# Patient Record
Sex: FEMALE | Race: WHITE | NOT HISPANIC OR LATINO | Employment: OTHER | URBAN - METROPOLITAN AREA
[De-identification: names, ages, dates, MRNs, and addresses within clinical notes are randomized per-mention and may not be internally consistent; named-entity substitution may affect disease eponyms.]

---

## 2020-06-09 ENCOUNTER — HOSPITAL ENCOUNTER (EMERGENCY)
Facility: HOSPITAL | Age: 70
Discharge: HOME | End: 2020-06-09
Attending: EMERGENCY MEDICINE | Admitting: EMERGENCY MEDICINE

## 2020-06-09 ENCOUNTER — APPOINTMENT (OUTPATIENT)
Dept: CT IMAGING | Facility: HOSPITAL | Age: 70
End: 2020-06-09

## 2020-06-09 ENCOUNTER — APPOINTMENT (OUTPATIENT)
Dept: GENERAL RADIOLOGY | Facility: HOSPITAL | Age: 70
End: 2020-06-09

## 2020-06-09 VITALS
RESPIRATION RATE: 16 BRPM | DIASTOLIC BLOOD PRESSURE: 91 MMHG | SYSTOLIC BLOOD PRESSURE: 175 MMHG | BODY MASS INDEX: 35.44 KG/M2 | HEIGHT: 63 IN | HEART RATE: 59 BPM | TEMPERATURE: 98.2 F | WEIGHT: 200 LBS | OXYGEN SATURATION: 94 %

## 2020-06-09 DIAGNOSIS — I10 UNCONTROLLED HYPERTENSION: ICD-10-CM

## 2020-06-09 DIAGNOSIS — M79.604 BILATERAL LEG PAIN: ICD-10-CM

## 2020-06-09 DIAGNOSIS — R07.89 CHEST DISCOMFORT: Primary | ICD-10-CM

## 2020-06-09 DIAGNOSIS — M79.605 BILATERAL LEG PAIN: ICD-10-CM

## 2020-06-09 LAB
ALBUMIN SERPL-MCNC: 4.4 G/DL (ref 3.5–5.2)
ALBUMIN/GLOB SERPL: 1.5 G/DL
ALP SERPL-CCNC: 82 U/L (ref 39–117)
ALT SERPL W P-5'-P-CCNC: 20 U/L (ref 1–33)
ANION GAP SERPL CALCULATED.3IONS-SCNC: 14 MMOL/L (ref 5–15)
AST SERPL-CCNC: 25 U/L (ref 1–32)
BASOPHILS # BLD AUTO: 0.04 10*3/MM3 (ref 0–0.2)
BASOPHILS NFR BLD AUTO: 0.7 % (ref 0–1.5)
BILIRUB SERPL-MCNC: 0.9 MG/DL (ref 0.2–1.2)
BUN BLD-MCNC: 11 MG/DL (ref 8–23)
BUN/CREAT SERPL: 10.1 (ref 7–25)
CALCIUM SPEC-SCNC: 9.2 MG/DL (ref 8.6–10.5)
CHLORIDE SERPL-SCNC: 106 MMOL/L (ref 98–107)
CO2 SERPL-SCNC: 21 MMOL/L (ref 22–29)
CREAT BLD-MCNC: 1.09 MG/DL (ref 0.57–1)
D DIMER PPP FEU-MCNC: 0.65 MCGFEU/ML (ref 0–0.56)
DEPRECATED RDW RBC AUTO: 45.7 FL (ref 37–54)
EOSINOPHIL # BLD AUTO: 0.02 10*3/MM3 (ref 0–0.4)
EOSINOPHIL NFR BLD AUTO: 0.3 % (ref 0.3–6.2)
ERYTHROCYTE [DISTWIDTH] IN BLOOD BY AUTOMATED COUNT: 13.2 % (ref 12.3–15.4)
GFR SERPL CREATININE-BSD FRML MDRD: 49 ML/MIN/1.73
GLOBULIN UR ELPH-MCNC: 3 GM/DL
GLUCOSE BLD-MCNC: 112 MG/DL (ref 65–99)
HCT VFR BLD AUTO: 38.3 % (ref 34–46.6)
HGB BLD-MCNC: 12.8 G/DL (ref 12–15.9)
HOLD SPECIMEN: NORMAL
HOLD SPECIMEN: NORMAL
IMM GRANULOCYTES # BLD AUTO: 0.01 10*3/MM3 (ref 0–0.05)
IMM GRANULOCYTES NFR BLD AUTO: 0.2 % (ref 0–0.5)
LIPASE SERPL-CCNC: 14 U/L (ref 13–60)
LYMPHOCYTES # BLD AUTO: 2.4 10*3/MM3 (ref 0.7–3.1)
LYMPHOCYTES NFR BLD AUTO: 41.7 % (ref 19.6–45.3)
MCH RBC QN AUTO: 31.7 PG (ref 26.6–33)
MCHC RBC AUTO-ENTMCNC: 33.4 G/DL (ref 31.5–35.7)
MCV RBC AUTO: 94.8 FL (ref 79–97)
MONOCYTES # BLD AUTO: 0.38 10*3/MM3 (ref 0.1–0.9)
MONOCYTES NFR BLD AUTO: 6.6 % (ref 5–12)
NEUTROPHILS # BLD AUTO: 2.91 10*3/MM3 (ref 1.7–7)
NEUTROPHILS NFR BLD AUTO: 50.5 % (ref 42.7–76)
NRBC BLD AUTO-RTO: 0 /100 WBC (ref 0–0.2)
NT-PROBNP SERPL-MCNC: 249.1 PG/ML (ref 5–900)
PLATELET # BLD AUTO: 260 10*3/MM3 (ref 140–450)
PMV BLD AUTO: 9.6 FL (ref 6–12)
POTASSIUM BLD-SCNC: 3.9 MMOL/L (ref 3.5–5.2)
PROT SERPL-MCNC: 7.4 G/DL (ref 6–8.5)
RBC # BLD AUTO: 4.04 10*6/MM3 (ref 3.77–5.28)
SODIUM BLD-SCNC: 141 MMOL/L (ref 136–145)
TROPONIN T SERPL-MCNC: <0.01 NG/ML (ref 0–0.03)
TROPONIN T SERPL-MCNC: <0.01 NG/ML (ref 0–0.03)
WBC NRBC COR # BLD: 5.76 10*3/MM3 (ref 3.4–10.8)
WHOLE BLOOD HOLD SPECIMEN: NORMAL
WHOLE BLOOD HOLD SPECIMEN: NORMAL

## 2020-06-09 PROCEDURE — 0 IOPAMIDOL PER 1 ML: Performed by: EMERGENCY MEDICINE

## 2020-06-09 PROCEDURE — 93005 ELECTROCARDIOGRAM TRACING: CPT

## 2020-06-09 PROCEDURE — 25010000002 ONDANSETRON PER 1 MG: Performed by: EMERGENCY MEDICINE

## 2020-06-09 PROCEDURE — 71275 CT ANGIOGRAPHY CHEST: CPT

## 2020-06-09 PROCEDURE — 84484 ASSAY OF TROPONIN QUANT: CPT | Performed by: EMERGENCY MEDICINE

## 2020-06-09 PROCEDURE — 71045 X-RAY EXAM CHEST 1 VIEW: CPT

## 2020-06-09 PROCEDURE — 83880 ASSAY OF NATRIURETIC PEPTIDE: CPT | Performed by: EMERGENCY MEDICINE

## 2020-06-09 PROCEDURE — 99284 EMERGENCY DEPT VISIT MOD MDM: CPT

## 2020-06-09 PROCEDURE — 85025 COMPLETE CBC W/AUTO DIFF WBC: CPT | Performed by: EMERGENCY MEDICINE

## 2020-06-09 PROCEDURE — 80053 COMPREHEN METABOLIC PANEL: CPT | Performed by: EMERGENCY MEDICINE

## 2020-06-09 PROCEDURE — 93005 ELECTROCARDIOGRAM TRACING: CPT | Performed by: EMERGENCY MEDICINE

## 2020-06-09 PROCEDURE — 83690 ASSAY OF LIPASE: CPT | Performed by: EMERGENCY MEDICINE

## 2020-06-09 PROCEDURE — 85379 FIBRIN DEGRADATION QUANT: CPT | Performed by: EMERGENCY MEDICINE

## 2020-06-09 PROCEDURE — 96374 THER/PROPH/DIAG INJ IV PUSH: CPT

## 2020-06-09 RX ORDER — SODIUM CHLORIDE 0.9 % (FLUSH) 0.9 %
10 SYRINGE (ML) INJECTION AS NEEDED
Status: DISCONTINUED | OUTPATIENT
Start: 2020-06-09 | End: 2020-06-09 | Stop reason: HOSPADM

## 2020-06-09 RX ORDER — ASPIRIN 81 MG/1
324 TABLET, CHEWABLE ORAL ONCE
Status: COMPLETED | OUTPATIENT
Start: 2020-06-09 | End: 2020-06-09

## 2020-06-09 RX ORDER — FAMOTIDINE 20 MG/1
20 TABLET, FILM COATED ORAL 2 TIMES DAILY
Qty: 30 TABLET | Refills: 0 | Status: SHIPPED | OUTPATIENT
Start: 2020-06-09 | End: 2020-06-19

## 2020-06-09 RX ORDER — ONDANSETRON 2 MG/ML
4 INJECTION INTRAMUSCULAR; INTRAVENOUS ONCE
Status: COMPLETED | OUTPATIENT
Start: 2020-06-09 | End: 2020-06-09

## 2020-06-09 RX ADMIN — ONDANSETRON 4 MG: 2 INJECTION INTRAMUSCULAR; INTRAVENOUS at 20:02

## 2020-06-09 RX ADMIN — ASPIRIN 81 MG 324 MG: 81 TABLET ORAL at 18:04

## 2020-06-09 RX ADMIN — SODIUM CHLORIDE 1000 ML: 9 INJECTION, SOLUTION INTRAVENOUS at 20:02

## 2020-06-09 RX ADMIN — IOPAMIDOL 70 ML: 755 INJECTION, SOLUTION INTRAVENOUS at 19:40

## 2020-06-09 NOTE — ED PROVIDER NOTES
EMERGENCY DEPARTMENT ENCOUNTER    Room Number:  BEAU/BEAU  Date of encounter:  6/10/2020  PCP: Houston Diamond MD  Historian: Patient      HPI:  Chief Complaint: Chest heaviness  A complete HPI/ROS/PMH/PSH/SH/FH are unobtainable due to: N/A    Context: Katerin Pineda is a 69 y.o. female who presents to the ED c/o chest heaviness. She describes this as an intermittent, mild heaviness and denies any pain. This onset 3 days ago. These episodes only last a few seconds in duration when present. She also complains of associated nausea. She spoke with her primary care provider this morning to discuss this and  Her primary care provider is Dr. Diamond. She has a history of diabetes, hypertension and hyperlipidemia but denies drug use, alcohol use, or smoking. She has a family history of heart disease under the age of 50. She has never had a cardiac catheterization. There are no other acute symptoms at this time.    She reports for the past several months she has been experiencing an aching pain in her bilateral feet, usually at night. The pain has migrated into her calves bilaterally last night and she had difficulty sleeping. She reports this concerns her more than the chest discomfort.      PAST MEDICAL HISTORY  Active Ambulatory Problems     Diagnosis Date Noted   • No Active Ambulatory Problems     Resolved Ambulatory Problems     Diagnosis Date Noted   • No Resolved Ambulatory Problems     Past Medical History:   Diagnosis Date   • Anxiety    • Disease of thyroid gland    • Hyperlipidemia    • Hypertension    • Sleep apnea          PAST SURGICAL HISTORY  Past Surgical History:   Procedure Laterality Date   • KNEE ARTHROPLASTY, PARTIAL REPLACEMENT Left    • UVULOPALATOPHARYNGOPLASTY           FAMILY HISTORY  History reviewed. No pertinent family history.      SOCIAL HISTORY  Social History     Socioeconomic History   • Marital status:      Spouse name: Not on file   • Number of children: Not on file   •  Years of education: Not on file   • Highest education level: Not on file   Tobacco Use   • Smoking status: Never Smoker   Substance and Sexual Activity   • Alcohol use: Not Currently   • Drug use: Never         ALLERGIES  Patient has no known allergies.        REVIEW OF SYSTEMS  Review of Systems     All systems reviewed and negative except for those discussed in HPI.       PHYSICAL EXAM    I have reviewed the triage vital signs and nursing notes.    ED Triage Vitals [06/09/20 1632]   Temp Heart Rate Resp BP SpO2   98.2 °F (36.8 °C) 87 16 (!) 200/87 96 %      Temp src Heart Rate Source Patient Position BP Location FiO2 (%)   Infrared Monitor Sitting Left arm --       Physical Exam   Constitutional: She is oriented to person, place, and time and well-developed, well-nourished, and in no distress. No distress.   Appears mildly anxious.    HENT:   Head: Normocephalic and atraumatic.   Right Ear: External ear normal.   Left Ear: External ear normal.   Eyes: Conjunctivae are normal. No scleral icterus.   Neck: Normal range of motion. Neck supple.   Cardiovascular: Normal rate, regular rhythm and normal heart sounds.   Pulmonary/Chest: Effort normal and breath sounds normal. No respiratory distress.   No reproduction of chest discomfort with palpation of the chest wall or epigastric region.   Abdominal: Soft. There is no tenderness.   Musculoskeletal: Normal range of motion. She exhibits no edema.   Calves are symmetric and non-tender. Devon's sign negative bilaterally.   Neurological: She is alert and oriented to person, place, and time. Coordination normal.   Skin: Skin is warm and dry. She is not diaphoretic.   Psychiatric: Affect and judgment normal.   Nursing note and vitals reviewed.        ED COURSE  ED Course as of Jose Angel 10 1314   Tue Jun 09, 2020   6480 Dr. Rodriguez is at the bedside re-evaluating the patient and updating them on results and plan. She is comfortable with outpatient follow up. Patient is unsure if  she has taken her blood pressure medication today. Dr. Rodriguez gave her instruction on what to do at home if this is the case.    [JW]      ED Course User Index  [JW] Chad James         LAB RESULTS  Recent Results (from the past 24 hour(s))   Troponin    Collection Time: 06/09/20  6:14 PM   Result Value Ref Range    Troponin T <0.010 0.000 - 0.030 ng/mL   Comprehensive Metabolic Panel    Collection Time: 06/09/20  6:14 PM   Result Value Ref Range    Glucose 112 (H) 65 - 99 mg/dL    BUN 11 8 - 23 mg/dL    Creatinine 1.09 (H) 0.57 - 1.00 mg/dL    Sodium 141 136 - 145 mmol/L    Potassium 3.9 3.5 - 5.2 mmol/L    Chloride 106 98 - 107 mmol/L    CO2 21.0 (L) 22.0 - 29.0 mmol/L    Calcium 9.2 8.6 - 10.5 mg/dL    Total Protein 7.4 6.0 - 8.5 g/dL    Albumin 4.40 3.50 - 5.20 g/dL    ALT (SGPT) 20 1 - 33 U/L    AST (SGOT) 25 1 - 32 U/L    Alkaline Phosphatase 82 39 - 117 U/L    Total Bilirubin 0.9 0.2 - 1.2 mg/dL    eGFR Non African Amer 49 (L) >60 mL/min/1.73    Globulin 3.0 gm/dL    A/G Ratio 1.5 g/dL    BUN/Creatinine Ratio 10.1 7.0 - 25.0    Anion Gap 14.0 5.0 - 15.0 mmol/L   Lipase    Collection Time: 06/09/20  6:14 PM   Result Value Ref Range    Lipase 14 13 - 60 U/L   BNP    Collection Time: 06/09/20  6:14 PM   Result Value Ref Range    proBNP 249.1 5.0 - 900.0 pg/mL   Light Blue Top    Collection Time: 06/09/20  6:14 PM   Result Value Ref Range    Extra Tube hold for add-on    Green Top (Gel)    Collection Time: 06/09/20  6:14 PM   Result Value Ref Range    Extra Tube Hold for add-ons.    Lavender Top    Collection Time: 06/09/20  6:14 PM   Result Value Ref Range    Extra Tube hold for add-on    Gold Top - SST    Collection Time: 06/09/20  6:14 PM   Result Value Ref Range    Extra Tube Hold for add-ons.    CBC Auto Differential    Collection Time: 06/09/20  6:14 PM   Result Value Ref Range    WBC 5.76 3.40 - 10.80 10*3/mm3    RBC 4.04 3.77 - 5.28 10*6/mm3    Hemoglobin 12.8 12.0 - 15.9 g/dL    Hematocrit 38.3  34.0 - 46.6 %    MCV 94.8 79.0 - 97.0 fL    MCH 31.7 26.6 - 33.0 pg    MCHC 33.4 31.5 - 35.7 g/dL    RDW 13.2 12.3 - 15.4 %    RDW-SD 45.7 37.0 - 54.0 fl    MPV 9.6 6.0 - 12.0 fL    Platelets 260 140 - 450 10*3/mm3    Neutrophil % 50.5 42.7 - 76.0 %    Lymphocyte % 41.7 19.6 - 45.3 %    Monocyte % 6.6 5.0 - 12.0 %    Eosinophil % 0.3 0.3 - 6.2 %    Basophil % 0.7 0.0 - 1.5 %    Immature Grans % 0.2 0.0 - 0.5 %    Neutrophils, Absolute 2.91 1.70 - 7.00 10*3/mm3    Lymphocytes, Absolute 2.40 0.70 - 3.10 10*3/mm3    Monocytes, Absolute 0.38 0.10 - 0.90 10*3/mm3    Eosinophils, Absolute 0.02 0.00 - 0.40 10*3/mm3    Basophils, Absolute 0.04 0.00 - 0.20 10*3/mm3    Immature Grans, Absolute 0.01 0.00 - 0.05 10*3/mm3    nRBC 0.0 0.0 - 0.2 /100 WBC   D-dimer, Quantitative    Collection Time: 06/09/20  6:14 PM   Result Value Ref Range    D-Dimer, Quantitative 0.65 (H) 0.00 - 0.56 MCGFEU/mL   Troponin    Collection Time: 06/09/20  8:34 PM   Result Value Ref Range    Troponin T <0.010 0.000 - 0.030 ng/mL       Ordered the above labs and independently reviewed the results.        RADIOLOGY  Xr Chest 1 View    Result Date: 6/9/2020  EXAMINATION: XR CHEST 1 VW- 06/09/2020  INDICATION: Chest Pain triage protocol  COMPARISON: None  FINDINGS: Heart is mildly enlarged. Vasculature appears upper limits of normal. Lungs appear well-expanded and clear except for granulomatous calcifications in the right hilar region.      Mild cardiomegaly and evidence of old granulomatous disease. No acute chest pathology is identified.   D:  06/09/2020 E:  06/09/2020  This report was finalized on 6/9/2020 10:39 PM by Dr. Hitesh Tejeda MD.      Ct Angiogram Chest    Result Date: 6/9/2020  CTA Chest INDICATION: One day history of chest heaviness and nausea with elevated d-dimer. TECHNIQUE: CT angiogram of the chest with 100 cc IV contrast. 3-D reconstructions were obtained and reviewed.   Radiation dose reduction techniques included automated exposure  control or exposure modulation based on body size. Count of known CT and cardiac nuc med studies performed in previous 12 months: 0. COMPARISON: No pertinent prior study FINDINGS: Contrast timing is appropriate for maximal sensitivity. The main pulmonary trunk is of normal caliber. No filling defects in the central, lobar, or segmental pulmonary arteries. No interventricular septal bowing or hepatic reflux of contrast to suggest right heart strain. Heart size is normal. No pericardial effusion. The aorta is non aneurysmal without evidence of acute injury. Central airways are patent. No endobronchial lesions. No focal consolidation, pleural effusion, or pneumothorax. No enlarged mediastinal, axillary or hilar lymph nodes. No acute findings in visualized upper abdomen. Regional osseous structures show no acute or aggressive bone lesions. Degenerative changes of the thoracic spine.     Impression: 1. No evidence of pulmonary embolus on this study. 2. No acute radiographic abnormality is demonstrated. Signer Name: Ad Roberts MD  Signed: 6/9/2020 7:58 PM  Workstation Name: [facility]  [facility]        I ordered and reviewed the above noted radiographic studies.    I viewed images of CTA chest which showed no evidence of pulmonary embolism or other abnormality per my independent interpretation.  See radiologist's dictation for official interpretation.       PROCEDURES    Procedures      MEDICATIONS GIVEN IN ER    Medications   aspirin chewable tablet 324 mg (324 mg Oral Given 6/9/20 1804)   sodium chloride 0.9 % bolus 1,000 mL (0 mL Intravenous Stopped 6/9/20 2151)   ondansetron (ZOFRAN) injection 4 mg (4 mg Intravenous Given 6/9/20 2002)   iopamidol (ISOVUE-370) 76 % injection 100 mL (70 mL Intravenous Given 6/9/20 1940)         PROGRESS, DATA ANALYSIS, CONSULTS, AND MEDICAL DECISION MAKING    All labs have been independently reviewed by me.  All radiology studies have been reviewed by me  and discussed with radiologist dictating the report.   EKG's independently viewed and interpreted by me.  Discussion below represents my analysis of pertinent findings related to patient's condition, differential diagnosis, treatment plan and final disposition.        ED Course as of Jose Angel 10 1314   Tue Jun 09, 2020   2140 Dr. Rodriguez is at the bedside re-evaluating the patient and updating them on results and plan. She is comfortable with outpatient follow up. Patient is unsure if she has taken her blood pressure medication today. Dr. Rodriguez gave her instruction on what to do at home if this is the case.    [JW]      ED Course User Index  [JW] Chad James           PPE: Both the patient and I worse a surgical mask throughout the entire patient encounter.     AS OF 13:14 VITALS:    BP - 175/91  HR - 59  TEMP - 98.2 °F (36.8 °C) (Infrared)  O2 SATS - 94%        DIAGNOSIS  Final diagnoses:   Chest discomfort   Bilateral leg pain   Uncontrolled hypertension         DISPOSITION  DISCHARGE    Patient discharged in stable condition.    Reviewed implications of results, diagnosis, meds, responsibility to follow up, warning signs and symptoms of possible worsening, potential complications and reasons to return to ER.    Patient/Family voiced understanding of above instructions.    Discussed plan for discharge, as there is no emergent indication for admission.  Pt/family is agreeable and understands need for follow up and possible repeat testing.  Pt/family is aware that discharge does not mean that nothing is wrong but that it indicates no emergency is currently present that requires admission and they must continue care with follow-up as given below or with a physician of their choice.     FOLLOW-UP  Houston Diamond MD  [address] [place] [id number] [phone]      Next available for re-check    [facility]  [address]  [facility]  [address]  [phone]        [facility]  [address]  [address]  [facility] [address]  [phone]             Medication List      New Prescriptions    famotidine 20 MG tablet  Commonly known as:  PEPCID  Take 1 tablet by mouth 2 (Two) Times a Day.                     Chad James  06/09/20 2129       Chad James  06/09/20 2133       Chad James  06/09/20 2141       Royce Rodriguez MD  06/10/20 3972

## 2020-06-10 NOTE — DISCHARGE INSTRUCTIONS
Please check your blood pressure 3 times a day. Keep a chart of these readings and any correlation to symptoms you might be having and bring this chart to the follow up with your primary care physician. If you don't already have a good blood pressure cuff, I recommend the following:    [employer] - [employer] Blood Pressure Monitor (for upper arm)    or similar upper arm blood pressure cuffs which can be purchased for around $25.    Take your blood pressure medication in the morning after checking your blood pressure.    Follow up with the [facility] as I have ordered.     Avoid heavy or late meals in the evening.    Begin taking Pepcid as prescribed.    Follow up with your primary care provider next available. Call for an appointment.    Return to the ER if you experience any new or worsening symptoms.

## 2020-06-18 NOTE — PROGRESS NOTES
[facility]      06/19/2020         Katerin Pineda  [address] [facility] [address]  [unfilled]    1950    Houston Diamond MD    Katerin Pineda is a 69 y.o. female.      Subjective:     Chief Complaint:  Establish Care and Chest Pain       HPI   69-year-old female with history of diabetes, hypertension, hyperlipidemia, hypothyroidism, obstructive sleep apnea and depression who presents today for evaluation of chest pain at the request of Dr. Rodriguez.  Patient presented the ED on 6/9 with a 3-day history of intermittent, mild chest heaviness.  She reports the episodes only last for few seconds in duration.  She does have associated nausea.  CTA chest negative for PE.  Chest x-ray showed mild cardiomegaly but no acute abnormalities.  EKG negative for acute findings.  Troponins negative.    She reports that she had a 2 week history of nausea and a terrible taste in her mouth associated with decreased appetite. She also noted some burning in her feet.  She called her PCP to try and get in and mention that she also was having some pressure in her chest and was advised to go to the ED.  She reports that she was not concerned at all about the mild chest pressure but was more concerned about the burning in her feet.  She reports that symptoms have since resolved.  She does note exertional dyspnea, which is chronic for her.  Denies any worsening shortness of breath.    Patient had normal Lexiscan stress test in August 2019    Cardiac risk: Hypertension, diabetes, hyperlipidemia, age, obesity, family hx     Patient Active Problem List   Diagnosis   • Hyperlipidemia   • Hypertensive disorder   • Obstructive sleep apnea syndrome   • Type 2 diabetes mellitus without complication (CMS/[facility])   • Hypothyroidism   • Chronic depression   • Obesity   • Vitamin D deficiency       Past Medical History:   Diagnosis Date   • Anxiety    • Disease of thyroid gland    • Hyperlipidemia    •  Hypertension    • Sleep apnea        Past Surgical History:   Procedure Laterality Date   • KNEE ARTHROPLASTY, PARTIAL REPLACEMENT Left    • OTHER SURGICAL HISTORY      blsdder control implant   • UVULOPALATOPHARYNGOPLASTY         Family History   Problem Relation Age of Onset   • No Known Problems Mother    • Heart attack Father 45   • Coronary artery disease Sister 60   • Cancer Brother    • No Known Problems Maternal Grandmother    • No Known Problems Maternal Grandfather    • No Known Problems Paternal Grandmother    • No Known Problems Paternal Grandfather    • David Parkinson White syndrome Other        Social History     Socioeconomic History   • Marital status:      Spouse name: Not on file   • Number of children: Not on file   • Years of education: Not on file   • Highest education level: Not on file   Tobacco Use   • Smoking status: Never Smoker   • Smokeless tobacco: Never Used   Substance and Sexual Activity   • Alcohol use: Not Currently   • Drug use: Never   • Sexual activity: Defer   Social History Narrative    Caffeine: Seldom 2 glasses in week       No Known Allergies      Current Outpatient Medications:   •  amLODIPine (NORVASC) 5 MG tablet, amlodipine 5 mg tablet  TAKE 1 TABLET EVERY DAY, Disp: , Rfl:   •  benazepril (LOTENSIN) 40 MG tablet, Take 40 mg by mouth Daily., Disp: , Rfl:   •  ezetimibe (ZETIA) 10 MG tablet, ezetimibe 10 mg tablet  1 qd, Disp: , Rfl:   •  levothyroxine (SYNTHROID, LEVOTHROID) 75 MCG tablet, levothyroxine 75 mcg tablet  1 Daily, Disp: , Rfl:   •  metFORMIN (GLUCOPHAGE) 500 MG tablet, metformin 500 mg tablet  TAKE 1 TABLET TWICE DAILY, Disp: , Rfl:   •  pantoprazole (PROTONIX) 40 MG EC tablet, Take 1 tablet by mouth Daily., Disp: 30 tablet, Rfl: 0  •  pravastatin (PRAVACHOL) 40 MG tablet, pravastatin 40 mg tablet  Take 1 tablet every day by oral route., Disp: , Rfl:   •  sertraline (ZOLOFT) 100 MG tablet, Take 100 mg by mouth Daily., Disp: , Rfl:     The following  "portions of the patient's history were reviewed today and updated as appropriate: allergies, current medications, past family history, past medical history, past social history, past surgical history and problem list     Review of Systems   Constitution: Positive for malaise/fatigue. Negative for chills and fever.   HENT: Negative.    Eyes: Negative.    Cardiovascular: Positive for dyspnea on exertion. Negative for chest pain, claudication, cyanosis, irregular heartbeat, leg swelling, near-syncope, orthopnea, palpitations, paroxysmal nocturnal dyspnea and syncope.   Respiratory: Negative for cough, shortness of breath and snoring.    Endocrine: Negative.    Hematologic/Lymphatic: Does not bruise/bleed easily.   Skin: Negative for poor wound healing.   Musculoskeletal: Negative.    Gastrointestinal: Positive for heartburn. Negative for abdominal pain, hematemesis, melena, nausea and vomiting.   Genitourinary: Negative.  Negative for hematuria.   Neurological: Negative.    Psychiatric/Behavioral: Positive for depression. The patient is nervous/anxious.    Allergic/Immunologic: Negative.        Objective:     Vitals:    06/19/20 1319 06/19/20 1320   BP: 162/73 157/70   BP Location: Right arm Right arm   Patient Position: Sitting Standing   Cuff Size: Adult Adult   Pulse: 85 81   Resp:  18   Temp:  98.3 °F (36.8 °C)   TempSrc:  Temporal   SpO2: 95% 96%   Weight:  94.3 kg (208 lb)   Height:  160 cm (63\")       Body mass index is 36.85 kg/m².    Physical Exam   Constitutional: She is oriented to person, place, and time. She appears well-developed and well-nourished. No distress.   HENT:   Head: Normocephalic.   Eyes: Pupils are equal, round, and reactive to light. Conjunctivae are normal.   Neck: Neck supple. No JVD present. No thyromegaly present.   Cardiovascular: Normal rate, regular rhythm and intact distal pulses. Exam reveals no gallop and no friction rub.   Murmur (SHANNAN II) heard.  Pulmonary/Chest: Effort normal and " breath sounds normal. No respiratory distress. She has no wheezes. She has no rales. She exhibits no tenderness.   Abdominal: Soft. Bowel sounds are normal.   Musculoskeletal: Normal range of motion. She exhibits edema (1+ BLE).   Neurological: She is alert and oriented to person, place, and time.   Skin: Skin is warm and dry.   Psychiatric: She has a normal mood and affect. Her behavior is normal. Thought content normal.   Vitals reviewed.      Lab and Diagnostic Review:  Labs from [facility] 7/2019 hemoglobin A1c 5.9%.  Total cholesterol 169, LDL 66, HDL 83, triglycerides 101.    Normal nuclear stress test 8/2019  Results for orders placed or performed during the hospital encounter of 06/09/20   Troponin   Result Value Ref Range    Troponin T <0.010 0.000 - 0.030 ng/mL   Comprehensive Metabolic Panel   Result Value Ref Range    Glucose 112 (H) 65 - 99 mg/dL    BUN 11 8 - 23 mg/dL    Creatinine 1.09 (H) 0.57 - 1.00 mg/dL    Sodium 141 136 - 145 mmol/L    Potassium 3.9 3.5 - 5.2 mmol/L    Chloride 106 98 - 107 mmol/L    CO2 21.0 (L) 22.0 - 29.0 mmol/L    Calcium 9.2 8.6 - 10.5 mg/dL    Total Protein 7.4 6.0 - 8.5 g/dL    Albumin 4.40 3.50 - 5.20 g/dL    ALT (SGPT) 20 1 - 33 U/L    AST (SGOT) 25 1 - 32 U/L    Alkaline Phosphatase 82 39 - 117 U/L    Total Bilirubin 0.9 0.2 - 1.2 mg/dL    eGFR Non African Amer 49 (L) >60 mL/min/1.73    Globulin 3.0 gm/dL    A/G Ratio 1.5 g/dL    BUN/Creatinine Ratio 10.1 7.0 - 25.0    Anion Gap 14.0 5.0 - 15.0 mmol/L   Lipase   Result Value Ref Range    Lipase 14 13 - 60 U/L   BNP   Result Value Ref Range    proBNP 249.1 5.0 - 900.0 pg/mL   CBC Auto Differential   Result Value Ref Range    WBC 5.76 3.40 - 10.80 10*3/mm3    RBC 4.04 3.77 - 5.28 10*6/mm3    Hemoglobin 12.8 12.0 - 15.9 g/dL    Hematocrit 38.3 34.0 - 46.6 %    MCV 94.8 79.0 - 97.0 fL    MCH 31.7 26.6 - 33.0 pg    MCHC 33.4 31.5 - 35.7 g/dL    RDW 13.2 12.3 - 15.4 %    RDW-SD 45.7 37.0 - 54.0 fl    MPV 9.6 6.0 - 12.0  fL    Platelets 260 140 - 450 10*3/mm3    Neutrophil % 50.5 42.7 - 76.0 %    Lymphocyte % 41.7 19.6 - 45.3 %    Monocyte % 6.6 5.0 - 12.0 %    Eosinophil % 0.3 0.3 - 6.2 %    Basophil % 0.7 0.0 - 1.5 %    Immature Grans % 0.2 0.0 - 0.5 %    Neutrophils, Absolute 2.91 1.70 - 7.00 10*3/mm3    Lymphocytes, Absolute 2.40 0.70 - 3.10 10*3/mm3    Monocytes, Absolute 0.38 0.10 - 0.90 10*3/mm3    Eosinophils, Absolute 0.02 0.00 - 0.40 10*3/mm3    Basophils, Absolute 0.04 0.00 - 0.20 10*3/mm3    Immature Grans, Absolute 0.01 0.00 - 0.05 10*3/mm3    nRBC 0.0 0.0 - 0.2 /100 WBC   D-dimer, Quantitative   Result Value Ref Range    D-Dimer, Quantitative 0.65 (H) 0.00 - 0.56 MCGFEU/mL   Troponin   Result Value Ref Range    Troponin T <0.010 0.000 - 0.030 ng/mL     EKG 6/9/20  Normal sinus rhythm  Septal infarct (cited on or before 09-JUN-2020)  Abnormal ECG    Assessment and Plan:   1. Chest pain, unspecified type  Symptoms have resolved.  She denies chest pain multiple times for me and continues to correct me saying that it was only a very mild chest pressure.  She also notes some associated epigastric discomfort and bad taste in her mouth.  Symptoms likely related to GERD.  We will start her on Protonix 40 mg daily.  She does have multiple ASCVD risks.  Normal stress test less than 1 year ago.  I have advised her to call with any worsening symptoms    2. Essential hypertension  Patient was anxious at the time that her blood pressure was checked.  I have advised her to start monitoring at home and keep a record  - Adult Transthoracic Echo Complete W/ Cont if Necessary Per Protocol; Future    3. SHAW (dyspnea on exertion)  - Adult Transthoracic Echo Complete W/ Cont if Necessary Per Protocol; Future    4. Heart murmur  - Adult Transthoracic Echo Complete W/ Cont if Necessary Per Protocol; Future    5. GERD  Start protonix 40mg daily    6. Hyperlipidemia  Controlled on pravastatin and zetia      Consider cardiology referral pending  echo results and symptoms  Keep upcoming follow-up with PCP regarding GERD and neuropathic pain in her legs    It has been a pleasure to participate in the care of this patient.  Patient was instructed to call the [facility] with any questions, concerns, or worsening symptoms.    *Please note that portions of this note were completed with a voice recognition program. Efforts were made to edit the dictations, but occasionally words are mistranscribed.

## 2020-06-19 ENCOUNTER — OFFICE VISIT (OUTPATIENT)
Dept: CARDIOLOGY | Facility: HOSPITAL | Age: 70
End: 2020-06-19

## 2020-06-19 VITALS
BODY MASS INDEX: 36.86 KG/M2 | HEIGHT: 63 IN | TEMPERATURE: 98.3 F | SYSTOLIC BLOOD PRESSURE: 157 MMHG | OXYGEN SATURATION: 96 % | HEART RATE: 81 BPM | WEIGHT: 208 LBS | RESPIRATION RATE: 18 BRPM | DIASTOLIC BLOOD PRESSURE: 70 MMHG

## 2020-06-19 DIAGNOSIS — R01.1 HEART MURMUR: ICD-10-CM

## 2020-06-19 DIAGNOSIS — K21.9 GASTROESOPHAGEAL REFLUX DISEASE, ESOPHAGITIS PRESENCE NOT SPECIFIED: ICD-10-CM

## 2020-06-19 DIAGNOSIS — E78.5 HYPERLIPIDEMIA, UNSPECIFIED HYPERLIPIDEMIA TYPE: ICD-10-CM

## 2020-06-19 DIAGNOSIS — R06.09 DOE (DYSPNEA ON EXERTION): ICD-10-CM

## 2020-06-19 DIAGNOSIS — R07.9 CHEST PAIN, UNSPECIFIED TYPE: Primary | ICD-10-CM

## 2020-06-19 DIAGNOSIS — I10 ESSENTIAL HYPERTENSION: ICD-10-CM

## 2020-06-19 PROBLEM — G47.33 OBSTRUCTIVE SLEEP APNEA SYNDROME: Status: ACTIVE | Noted: 2019-07-16

## 2020-06-19 PROCEDURE — 99204 OFFICE O/P NEW MOD 45 MIN: CPT | Performed by: NURSE PRACTITIONER

## 2020-06-19 RX ORDER — BENAZEPRIL HYDROCHLORIDE 40 MG/1
40 TABLET, FILM COATED ORAL DAILY
COMMUNITY

## 2020-06-19 RX ORDER — SERTRALINE HYDROCHLORIDE 100 MG/1
100 TABLET, FILM COATED ORAL DAILY
COMMUNITY

## 2020-06-19 RX ORDER — PANTOPRAZOLE SODIUM 40 MG/1
40 TABLET, DELAYED RELEASE ORAL DAILY
Qty: 30 TABLET | Refills: 0 | Status: SHIPPED | OUTPATIENT
Start: 2020-06-19

## 2020-06-19 RX ORDER — PRAVASTATIN SODIUM 40 MG
TABLET ORAL
COMMUNITY

## 2020-06-19 RX ORDER — LEVOTHYROXINE SODIUM 0.07 MG/1
TABLET ORAL
COMMUNITY

## 2020-06-19 RX ORDER — AMLODIPINE BESYLATE 5 MG/1
TABLET ORAL
COMMUNITY

## 2020-06-19 RX ORDER — EZETIMIBE 10 MG/1
TABLET ORAL
COMMUNITY

## 2020-10-31 ENCOUNTER — NURSE TRIAGE (OUTPATIENT)
Dept: CALL CENTER | Facility: HOSPITAL | Age: 70
End: 2020-10-31

## 2020-10-31 ENCOUNTER — APPOINTMENT (OUTPATIENT)
Dept: GENERAL RADIOLOGY | Facility: HOSPITAL | Age: 70
End: 2020-10-31

## 2020-10-31 ENCOUNTER — HOSPITAL ENCOUNTER (EMERGENCY)
Facility: HOSPITAL | Age: 70
Discharge: HOME | End: 2020-10-31
Attending: EMERGENCY MEDICINE | Admitting: EMERGENCY MEDICINE

## 2020-10-31 VITALS
TEMPERATURE: 98.6 F | SYSTOLIC BLOOD PRESSURE: 149 MMHG | HEIGHT: 63 IN | RESPIRATION RATE: 16 BRPM | BODY MASS INDEX: 35.44 KG/M2 | WEIGHT: 200 LBS | DIASTOLIC BLOOD PRESSURE: 83 MMHG | HEART RATE: 48 BPM | OXYGEN SATURATION: 95 %

## 2020-10-31 DIAGNOSIS — R52 GENERALIZED BODY ACHES: ICD-10-CM

## 2020-10-31 DIAGNOSIS — R51.9 ACUTE NONINTRACTABLE HEADACHE, UNSPECIFIED HEADACHE TYPE: ICD-10-CM

## 2020-10-31 DIAGNOSIS — R68.89 FLU-LIKE SYMPTOMS: ICD-10-CM

## 2020-10-31 DIAGNOSIS — E86.0 MILD DEHYDRATION: Primary | ICD-10-CM

## 2020-10-31 DIAGNOSIS — R82.81 PYURIA, STERILE: ICD-10-CM

## 2020-10-31 LAB
ALBUMIN SERPL-MCNC: 4.5 G/DL (ref 3.5–5.2)
ALBUMIN/GLOB SERPL: 1.6 G/DL
ALP SERPL-CCNC: 82 U/L (ref 39–117)
ALT SERPL W P-5'-P-CCNC: 16 U/L (ref 1–33)
ANION GAP SERPL CALCULATED.3IONS-SCNC: 13 MMOL/L (ref 5–15)
AST SERPL-CCNC: 20 U/L (ref 1–32)
BACTERIA UR QL AUTO: ABNORMAL /HPF
BASOPHILS # BLD AUTO: 0.01 10*3/MM3 (ref 0–0.2)
BASOPHILS NFR BLD AUTO: 0.3 % (ref 0–1.5)
BILIRUB SERPL-MCNC: 0.4 MG/DL (ref 0–1.2)
BILIRUB UR QL STRIP: ABNORMAL
BUN SERPL-MCNC: 12 MG/DL (ref 8–23)
BUN/CREAT SERPL: 12 (ref 7–25)
CALCIUM SPEC-SCNC: 9.4 MG/DL (ref 8.6–10.5)
CHLORIDE SERPL-SCNC: 107 MMOL/L (ref 98–107)
CLARITY UR: ABNORMAL
CO2 SERPL-SCNC: 20 MMOL/L (ref 22–29)
COLOR UR: ABNORMAL
CREAT SERPL-MCNC: 1 MG/DL (ref 0.57–1)
DEPRECATED RDW RBC AUTO: 46.5 FL (ref 37–54)
EOSINOPHIL # BLD AUTO: 0.02 10*3/MM3 (ref 0–0.4)
EOSINOPHIL NFR BLD AUTO: 0.7 % (ref 0.3–6.2)
ERYTHROCYTE [DISTWIDTH] IN BLOOD BY AUTOMATED COUNT: 13.2 % (ref 12.3–15.4)
FLUAV SUBTYP SPEC NAA+PROBE: NOT DETECTED
FLUBV RNA ISLT QL NAA+PROBE: NOT DETECTED
GFR SERPL CREATININE-BSD FRML MDRD: 55 ML/MIN/1.73
GLOBULIN UR ELPH-MCNC: 2.8 GM/DL
GLUCOSE SERPL-MCNC: 127 MG/DL (ref 65–99)
GLUCOSE UR STRIP-MCNC: ABNORMAL MG/DL
HCT VFR BLD AUTO: 40 % (ref 34–46.6)
HGB BLD-MCNC: 13.3 G/DL (ref 12–15.9)
HGB UR QL STRIP.AUTO: ABNORMAL
HOLD SPECIMEN: NORMAL
HOLD SPECIMEN: NORMAL
HYALINE CASTS UR QL AUTO: ABNORMAL /LPF
IMM GRANULOCYTES # BLD AUTO: 0.01 10*3/MM3 (ref 0–0.05)
IMM GRANULOCYTES NFR BLD AUTO: 0.3 % (ref 0–0.5)
KETONES UR QL STRIP: ABNORMAL
LEUKOCYTE ESTERASE UR QL STRIP.AUTO: NEGATIVE
LYMPHOCYTES # BLD AUTO: 0.73 10*3/MM3 (ref 0.7–3.1)
LYMPHOCYTES NFR BLD AUTO: 24.4 % (ref 19.6–45.3)
MCH RBC QN AUTO: 31.7 PG (ref 26.6–33)
MCHC RBC AUTO-ENTMCNC: 33.3 G/DL (ref 31.5–35.7)
MCV RBC AUTO: 95.2 FL (ref 79–97)
MONOCYTES # BLD AUTO: 0.43 10*3/MM3 (ref 0.1–0.9)
MONOCYTES NFR BLD AUTO: 14.4 % (ref 5–12)
NEUTROPHILS NFR BLD AUTO: 1.79 10*3/MM3 (ref 1.7–7)
NEUTROPHILS NFR BLD AUTO: 59.9 % (ref 42.7–76)
NITRITE UR QL STRIP: NEGATIVE
NRBC BLD AUTO-RTO: 0 /100 WBC (ref 0–0.2)
NT-PROBNP SERPL-MCNC: 176.5 PG/ML (ref 0–900)
PH UR STRIP.AUTO: <=5 [PH] (ref 5–8)
PLATELET # BLD AUTO: 206 10*3/MM3 (ref 140–450)
PMV BLD AUTO: 9.4 FL (ref 6–12)
POTASSIUM SERPL-SCNC: 3.9 MMOL/L (ref 3.5–5.2)
PROT SERPL-MCNC: 7.3 G/DL (ref 6–8.5)
PROT UR QL STRIP: ABNORMAL
RBC # BLD AUTO: 4.2 10*6/MM3 (ref 3.77–5.28)
RBC # UR: ABNORMAL /HPF
REF LAB TEST METHOD: ABNORMAL
SODIUM SERPL-SCNC: 140 MMOL/L (ref 136–145)
SP GR UR STRIP: 1.05 (ref 1–1.03)
SQUAMOUS #/AREA URNS HPF: ABNORMAL /HPF
TROPONIN T SERPL-MCNC: <0.01 NG/ML (ref 0–0.03)
UROBILINOGEN UR QL STRIP: ABNORMAL
WBC # BLD AUTO: 2.99 10*3/MM3 (ref 3.4–10.8)
WBC UR QL AUTO: ABNORMAL /HPF
WHOLE BLOOD HOLD SPECIMEN: NORMAL
WHOLE BLOOD HOLD SPECIMEN: NORMAL
YEAST URNS QL MICRO: ABNORMAL /HPF

## 2020-10-31 PROCEDURE — 71045 X-RAY EXAM CHEST 1 VIEW: CPT

## 2020-10-31 PROCEDURE — 83880 ASSAY OF NATRIURETIC PEPTIDE: CPT | Performed by: EMERGENCY MEDICINE

## 2020-10-31 PROCEDURE — 96374 THER/PROPH/DIAG INJ IV PUSH: CPT

## 2020-10-31 PROCEDURE — 25010000002 KETOROLAC TROMETHAMINE PER 15 MG: Performed by: EMERGENCY MEDICINE

## 2020-10-31 PROCEDURE — 87502 INFLUENZA DNA AMP PROBE: CPT | Performed by: EMERGENCY MEDICINE

## 2020-10-31 PROCEDURE — 85025 COMPLETE CBC W/AUTO DIFF WBC: CPT | Performed by: EMERGENCY MEDICINE

## 2020-10-31 PROCEDURE — 99284 EMERGENCY DEPT VISIT MOD MDM: CPT

## 2020-10-31 PROCEDURE — 93005 ELECTROCARDIOGRAM TRACING: CPT | Performed by: EMERGENCY MEDICINE

## 2020-10-31 PROCEDURE — 80053 COMPREHEN METABOLIC PANEL: CPT | Performed by: EMERGENCY MEDICINE

## 2020-10-31 PROCEDURE — 84484 ASSAY OF TROPONIN QUANT: CPT | Performed by: EMERGENCY MEDICINE

## 2020-10-31 PROCEDURE — 81001 URINALYSIS AUTO W/SCOPE: CPT | Performed by: EMERGENCY MEDICINE

## 2020-10-31 PROCEDURE — U0004 COV-19 TEST NON-CDC HGH THRU: HCPCS | Performed by: EMERGENCY MEDICINE

## 2020-10-31 RX ORDER — SODIUM CHLORIDE 0.9 % (FLUSH) 0.9 %
10 SYRINGE (ML) INJECTION AS NEEDED
Status: DISCONTINUED | OUTPATIENT
Start: 2020-10-31 | End: 2020-10-31 | Stop reason: HOSPADM

## 2020-10-31 RX ORDER — NAPROXEN 500 MG/1
500 TABLET ORAL 2 TIMES DAILY PRN
Qty: 12 TABLET | Refills: 0 | OUTPATIENT
Start: 2020-10-31 | End: 2021-01-30

## 2020-10-31 RX ORDER — NITROFURANTOIN 25; 75 MG/1; MG/1
100 CAPSULE ORAL 2 TIMES DAILY
Qty: 10 CAPSULE | Refills: 0 | Status: SHIPPED | OUTPATIENT
Start: 2020-10-31 | End: 2020-10-31 | Stop reason: ALTCHOICE

## 2020-10-31 RX ORDER — CEFDINIR 300 MG/1
300 CAPSULE ORAL 2 TIMES DAILY
Qty: 10 CAPSULE | Refills: 0 | Status: SHIPPED | OUTPATIENT
Start: 2020-10-31 | End: 2020-11-05

## 2020-10-31 RX ORDER — KETOROLAC TROMETHAMINE 30 MG/ML
15 INJECTION, SOLUTION INTRAMUSCULAR; INTRAVENOUS ONCE
Status: COMPLETED | OUTPATIENT
Start: 2020-10-31 | End: 2020-10-31

## 2020-10-31 RX ADMIN — SODIUM CHLORIDE 1000 ML: 9 INJECTION, SOLUTION INTRAVENOUS at 15:40

## 2020-10-31 RX ADMIN — KETOROLAC TROMETHAMINE 15 MG: 30 INJECTION, SOLUTION INTRAMUSCULAR; INTRAVENOUS at 13:52

## 2020-10-31 NOTE — TELEPHONE ENCOUNTER
"Her Mom has been sick since OCtober 21st. She has been in the bed, joint pain, headache, loss of wanting to eat, heaviness in chest, nore with breathing. She is not feeling well. 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 She just told her daughter. Advise to self isolate, needs to be seen today.     Reason for Disposition  • HIGH RISK patient (e.g., age > 64 years, diabetes, heart or lung disease, weak immune system)    Additional Information  • Negative: SEVERE difficulty breathing (e.g., struggling for each breath, speaks in single words)  • Negative: Difficult to awaken or acting confused (e.g., disoriented, slurred speech)  • Negative: Bluish (or gray) lips or face now  • Negative: Shock suspected (e.g., cold/pale/clammy skin, too weak to stand, low BP, rapid pulse)  • Negative: Sounds like a life-threatening emergency to the triager  • Negative: [1] COVID-19 exposure AND [2] no symptoms  • Negative: COVID-19 and Breastfeeding, questions about  • Negative: [1] Adult with possible COVID-19 symptoms AND [2] triager concerned about severity of symptoms or other causes  • Negative: SEVERE or constant chest pain or pressure (Exception: mild central chest pain, present only when coughing)  • Negative: MODERATE difficulty breathing (e.g., speaks in phrases, SOB even at rest, pulse 100-120)  • Negative: Patient sounds very sick or weak to the triager  • Negative: MILD difficulty breathing (e.g., minimal/no SOB at rest, SOB with walking, pulse <100)  • Negative: Chest pain or pressure  • Negative: Fever > 103 F (39.4 C)  • Negative: [1] Fever > 101 F (38.3 C) AND [2] age > 60  • Negative: [1] Fever > 100.0 F (37.8 C) AND [2] bedridden (e.g., nursing home patient, CVA, chronic illness, recovering from surgery)    Answer Assessment - Initial Assessment Questions  1. COVID-19 DIAGNOSIS: \"Who made your Coronavirus (COVID-19) diagnosis?\" \"Was it confirmed by a positive lab " "test?\" If not diagnosed by a HCP, ask \"Are there lots of cases (community spread) where you live?\" (See public health department website, if unsure)      It has not been confirmed.   2. ONSET: \"When did the COVID-19 symptoms start?\"       10/21/2020  3. WORST SYMPTOM: \"What is your worst symptom?\" (e.g., cough, fever, shortness of breath, muscle aches)      Head pain   4. COUGH: \"Do you have a cough?\" If so, ask: \"How bad is the cough?\"        A little bit   5. FEVER: \"Do you have a fever?\" If so, ask: \"What is your temperature, how was it measured, and when did it start?\"      She thinks she may be warm.  6. RESPIRATORY STATUS: \"Describe your breathing?\" (e.g., shortness of breath, wheezing, unable to speak)       It feels tight.   7. BETTER-SAME-WORSE: \"Are you getting better, staying the same or getting worse compared to yesterday?\"  If getting worse, ask, \"In what way?\"      Worse.   8. HIGH RISK DISEASE: \"Do you have any chronic medical problems?\" (e.g., asthma, heart or lung disease, weak immune system, etc.)      daibetic   9. PREGNANCY: \"Is there any chance you are pregnant?\" \"When was your last menstrual period?\"      n  10. OTHER SYMPTOMS: \"Do you have any other symptoms?\"  (e.g., chills, fatigue, headache, loss of smell or taste, muscle pain, sore throat)        Headache, joint pain and tiredness.    Protocols used: CORONAVIRUS (COVID-19) DIAGNOSED OR SUSPECTED-ADULT-AH      "

## 2020-11-01 ENCOUNTER — TELEPHONE (OUTPATIENT)
Dept: EMERGENCY DEPT | Facility: HOSPITAL | Age: 70
End: 2020-11-01

## 2020-11-01 LAB
QT INTERVAL: 374 MS
QTC INTERVAL: 436 MS
SARS-COV-2 RNA RESP QL NAA+PROBE: DETECTED

## 2020-11-01 NOTE — ED PROVIDER NOTES
Subjective   Patient is a pleasant, relatively healthy, 70-year-old female who presents with flulike symptoms.  She states that for the past 1 week has been having generalized fatigue, body aches, headache, mild, dry cough, and decreased appetite.  She denies shortness of breath this time and denies loss of taste or smell.  She denies fever or chills.      History provided by:  Patient  Flu Symptoms  Presenting symptoms: fatigue, headache, myalgias and nausea    Presenting symptoms: no diarrhea, no fever, no shortness of breath and no vomiting    Fatigue:     Severity:  Moderate    Duration:  7 days    Timing:  Constant    Progression:  Waxing and waning  Headaches:     Severity:  Moderate    Onset quality:  Gradual    Duration:  7 days    Timing:  Constant    Progression:  Waxing and waning    Chronicity:  New  Myalgias:     Location:  Generalized    Quality:  Aching    Severity:  Moderate    Onset quality:  Gradual    Duration:  7 days    Timing:  Constant    Progression:  Waxing and waning  Severity:  Mild      Review of Systems   Constitutional: Positive for fatigue. Negative for fever.   Respiratory: Negative for shortness of breath.    Cardiovascular: Negative for chest pain.   Gastrointestinal: Positive for nausea. Negative for abdominal pain, diarrhea and vomiting.   Musculoskeletal: Positive for myalgias.   Neurological: Positive for headaches.   All other systems reviewed and are negative.      Past Medical History:   Diagnosis Date   • Anxiety    • Disease of thyroid gland    • Hyperlipidemia    • Hypertension    • Sleep apnea        No Known Allergies    Past Surgical History:   Procedure Laterality Date   • KNEE ARTHROPLASTY, PARTIAL REPLACEMENT Left    • OTHER SURGICAL HISTORY      blsdder control implant   • UVULOPALATOPHARYNGOPLASTY         Family History   Problem Relation Age of Onset   • No Known Problems Mother    • Heart attack Father 45   • Coronary artery disease Sister 60   • Cancer Brother     • No Known Problems Maternal Grandmother    • No Known Problems Maternal Grandfather    • No Known Problems Paternal Grandmother    • No Known Problems Paternal Grandfather    • David Parkinson White syndrome Other        Social History     Socioeconomic History   • Marital status:      Spouse name: Not on file   • Number of children: Not on file   • Years of education: Not on file   • Highest education level: Not on file   Tobacco Use   • Smoking status: Never Smoker   • Smokeless tobacco: Never Used   Substance and Sexual Activity   • Alcohol use: Not Currently   • Drug use: Never   • Sexual activity: Defer   Social History Narrative    Caffeine: Seldom 2 glasses in week           Objective   Physical Exam  Vitals signs and nursing note reviewed.   Constitutional:       General: She is not in acute distress.     Appearance: Normal appearance. She is not ill-appearing.   HENT:      Head: Normocephalic and atraumatic.   Eyes:      Extraocular Movements: Extraocular movements intact.      Conjunctiva/sclera: Conjunctivae normal.      Pupils: Pupils are equal, round, and reactive to light.   Neck:      Musculoskeletal: Normal range of motion. No neck rigidity.      Thyroid: No thyromegaly.   Cardiovascular:      Rate and Rhythm: Normal rate and regular rhythm.      Pulses: Normal pulses.      Heart sounds: Normal heart sounds. No murmur. No friction rub. No gallop.    Pulmonary:      Effort: Pulmonary effort is normal. No respiratory distress.      Breath sounds: Normal breath sounds.   Abdominal:      General: Bowel sounds are normal.      Palpations: Abdomen is soft.      Tenderness: There is no abdominal tenderness. There is no guarding or rebound.   Musculoskeletal: Normal range of motion.   Lymphadenopathy:      Cervical: No cervical adenopathy.   Skin:     General: Skin is warm and dry.      Capillary Refill: Capillary refill takes less than 2 seconds.   Neurological:      Mental Status: She is alert  and oriented to person, place, and time.   Psychiatric:         Mood and Affect: Mood normal.         Behavior: Behavior normal.         Thought Content: Thought content normal.         Procedures           ED Course      Recent Results (from the past 24 hour(s))   Comprehensive Metabolic Panel    Collection Time: 10/31/20 12:23 PM    Specimen: Blood   Result Value Ref Range    Glucose 127 (H) 65 - 99 mg/dL    BUN 12 8 - 23 mg/dL    Creatinine 1.00 0.57 - 1.00 mg/dL    Sodium 140 136 - 145 mmol/L    Potassium 3.9 3.5 - 5.2 mmol/L    Chloride 107 98 - 107 mmol/L    CO2 20.0 (L) 22.0 - 29.0 mmol/L    Calcium 9.4 8.6 - 10.5 mg/dL    Total Protein 7.3 6.0 - 8.5 g/dL    Albumin 4.50 3.50 - 5.20 g/dL    ALT (SGPT) 16 1 - 33 U/L    AST (SGOT) 20 1 - 32 U/L    Alkaline Phosphatase 82 39 - 117 U/L    Total Bilirubin 0.4 0.0 - 1.2 mg/dL    eGFR Non African Amer 55 (L) >60 mL/min/1.73    Globulin 2.8 gm/dL    A/G Ratio 1.6 g/dL    BUN/Creatinine Ratio 12.0 7.0 - 25.0    Anion Gap 13.0 5.0 - 15.0 mmol/L   BNP    Collection Time: 10/31/20 12:23 PM    Specimen: Blood   Result Value Ref Range    proBNP 176.5 0.0 - 900.0 pg/mL   Troponin    Collection Time: 10/31/20 12:23 PM    Specimen: Blood   Result Value Ref Range    Troponin T <0.010 0.000 - 0.030 ng/mL   Light Blue Top    Collection Time: 10/31/20 12:23 PM   Result Value Ref Range    Extra Tube hold for add-on    Green Top (Gel)    Collection Time: 10/31/20 12:23 PM   Result Value Ref Range    Extra Tube Hold for add-ons.    Lavender Top    Collection Time: 10/31/20 12:23 PM   Result Value Ref Range    Extra Tube hold for add-on    Gold Top - SST    Collection Time: 10/31/20 12:23 PM   Result Value Ref Range    Extra Tube Hold for add-ons.    CBC Auto Differential    Collection Time: 10/31/20 12:23 PM    Specimen: Blood   Result Value Ref Range    WBC 2.99 (L) 3.40 - 10.80 10*3/mm3    RBC 4.20 3.77 - 5.28 10*6/mm3    Hemoglobin 13.3 12.0 - 15.9 g/dL    Hematocrit 40.0 34.0 -  46.6 %    MCV 95.2 79.0 - 97.0 fL    MCH 31.7 26.6 - 33.0 pg    MCHC 33.3 31.5 - 35.7 g/dL    RDW 13.2 12.3 - 15.4 %    RDW-SD 46.5 37.0 - 54.0 fl    MPV 9.4 6.0 - 12.0 fL    Platelets 206 140 - 450 10*3/mm3    Neutrophil % 59.9 42.7 - 76.0 %    Lymphocyte % 24.4 19.6 - 45.3 %    Monocyte % 14.4 (H) 5.0 - 12.0 %    Eosinophil % 0.7 0.3 - 6.2 %    Basophil % 0.3 0.0 - 1.5 %    Immature Grans % 0.3 0.0 - 0.5 %    Neutrophils, Absolute 1.79 1.70 - 7.00 10*3/mm3    Lymphocytes, Absolute 0.73 0.70 - 3.10 10*3/mm3    Monocytes, Absolute 0.43 0.10 - 0.90 10*3/mm3    Eosinophils, Absolute 0.02 0.00 - 0.40 10*3/mm3    Basophils, Absolute 0.01 0.00 - 0.20 10*3/mm3    Immature Grans, Absolute 0.01 0.00 - 0.05 10*3/mm3    nRBC 0.0 0.0 - 0.2 /100 WBC   Influenza A & B PCR - Swab, Nasopharynx    Collection Time: 10/31/20  1:55 PM    Specimen: Nasopharynx; Swab   Result Value Ref Range    Influenza A PCR Not Detected Not Detected    Influenza B PCR Not Detected Not Detected   Urinalysis With Microscopic If Indicated (No Culture) - Urine, Clean Catch    Collection Time: 10/31/20  2:00 PM    Specimen: Urine, Clean Catch   Result Value Ref Range    Color, UA Dark Yellow (A) Yellow, Straw    Appearance, UA Cloudy (A) Clear    pH, UA <=5.0 5.0 - 8.0    Specific Gravity, UA 1.052 (H) 1.001 - 1.030    Glucose,  mg/dL (Trace) (A) Negative    Ketones, UA Trace (A) Negative    Bilirubin, UA Small (1+) (A) Negative    Blood, UA Small (1+) (A) Negative    Protein, UA 30 mg/dL (1+) (A) Negative    Leuk Esterase, UA Negative Negative    Nitrite, UA Negative Negative    Urobilinogen, UA 1.0 E.U./dL 0.2 - 1.0 E.U./dL   Urinalysis, Microscopic Only - Urine, Clean Catch    Collection Time: 10/31/20  2:00 PM    Specimen: Urine, Clean Catch   Result Value Ref Range    RBC, UA 0-2 None Seen, 0-2 /HPF    WBC, UA 6-12 (A) None Seen, 0-2 /HPF    Bacteria, UA None Seen None Seen, Trace /HPF    Squamous Epithelial Cells, UA 7-12 (A) None Seen, 0-2  /HPF    Yeast, UA Small/1+ Yeast None Seen /HPF    Hyaline Casts, UA None Seen 0 - 6 /LPF    Methodology Manual Light Microscopy      Note: In addition to lab results from this visit, the labs listed above may include labs taken at another facility or during a different encounter within the last 24 hours. Please correlate lab times with ED admission and discharge times for further clarification of the services performed during this visit.    XR Chest 1 View   Final Result   No evidence of active chest disease.       DICTATED:   10/31/2020   EDITED/ls :   10/31/2020             This report was finalized on 10/31/2020 10:35 PM by Dr. Hitesh Tejeda MD.            Vitals:    10/31/20 1500 10/31/20 1530 10/31/20 1630 10/31/20 1730   BP: 137/74 130/79 123/85 149/83   Pulse: 53 57 52 (!) 48   Resp:       Temp:       TempSrc:       SpO2: 93% 96% 96% 95%   Weight:       Height:         Medications   ketorolac (TORADOL) injection 15 mg (15 mg Intravenous Given 10/31/20 1352)   sodium chloride 0.9 % bolus 1,000 mL (0 mL Intravenous Stopped 10/31/20 1802)     ECG/EMG Results (last 24 hours)     Procedure Component Value Units Date/Time    ECG 12 Lead [[id number]] Collected: 10/31/20 1219     Updated: 10/31/20 1217        ECG 12 Lead                       MDM    Final diagnoses:   Mild dehydration   Flu-like symptoms   Generalized body aches   Acute nonintractable headache, unspecified headache type   Pyuria, sterile            Nelson Colindres, DO  11/01/20 1151

## 2020-11-02 ENCOUNTER — EPISODE CHANGES (OUTPATIENT)
Dept: CASE MANAGEMENT | Facility: OTHER | Age: 70
End: 2020-11-02

## 2020-11-03 ENCOUNTER — READMISSION MANAGEMENT (OUTPATIENT)
Dept: CALL CENTER | Facility: HOSPITAL | Age: 70
End: 2020-11-03

## 2020-11-03 ENCOUNTER — PATIENT OUTREACH (OUTPATIENT)
Dept: CASE MANAGEMENT | Facility: OTHER | Age: 70
End: 2020-11-03

## 2020-11-03 NOTE — OUTREACH NOTE
"Patient Outreach Note    ED Potential Covid Discharge Follow-up    Called patient in follow up to ED visit 10-31-20 with flu like symptoms, mild dehydration, body aches, HA.  Patient was tested for Covid-19 virus; stated she was told of Positive Covid results.  Patient reports: having had several \"bad days\", but yesterday and today is a little better; still feeling very weak; is resting mostly.  Said the aches had improved, no fever.  Stated she was given a Pulse Ox in the ED; has been monitoring her oxygen saturation daily; reported O2 sats of 97-99% usually.  RN-ACM discussed the value of monitoring the O2 sat.  Patient stated the Health Dept has called her.  Said her PCP office called her today, they are aware of Covid Positive test/ ED visit, and set up a Telemed appt for tomorrow, 11/4.  She voiced compliance with Antibiotic as ordered in the ED, and voiced understanding of finishing all of the Abx.  RN-ACM advised to drink plenty of water/ fluids through out the day time, to stay hydrated.  Patient reported noticing black stools.  RN-ACM talked with her about possible reasons for black stools; advised her to talk with her PCP tomorrow about this.  She said she would.    Patient lives with her grandson.  Has assistance from her grandson or her daughter anytime needed.  .  Reports no barriers to getting prescriptions filled and taking medications as ordered.  Reports no food or transportation insecurities.    Reviewed with patient: Quarantine precautions (she voiced understanding and compliance); ED discharge recommendations, AVS from ED; 24/7 Nurse Triage Line, [phone]; Covid-19 Hotline#, [phone].  Discussed importance of close PCP f/u, telehealth and video appts. No other questions or concerns voiced at this time.      Pat Lynn RN  Ambulatory     11/3/2020, 10:25 EST        Pat Lynn RN  Ambulatory     11/3/2020, 10:25 EST      "

## 2020-11-03 NOTE — OUTREACH NOTE
Prep Survey      Responses   [facility] patient discharged from?  [place]   Is LACE score < 7 ?  Yes   Eligibility  Readm Mgmt   Discharge diagnosis  Covid Positive   Does the patient have one of the following disease processes/diagnoses(primary or secondary)?  COVID-19   Does the patient have Home health ordered?  No   Is there a DME ordered?  Yes   What DME was ordered?  Sent home w/ pulse ox   General alerts for this patient  ED Discharge - Call x 2 ONLY - 1st call completed by Amb    Prep survey completed?  Yes          Gisella Sorto RN

## 2020-11-04 ENCOUNTER — READMISSION MANAGEMENT (OUTPATIENT)
Dept: CALL CENTER | Facility: HOSPITAL | Age: 70
End: 2020-11-04

## 2020-11-04 NOTE — OUTREACH NOTE
COVID-19 Week 1 Survey      Responses   [facility] patient discharged from?  [place]   Does the patient have one of the following disease processes/diagnoses(primary or secondary)?  COVID-19   COVID-19 underlying condition?  None   Call Number  Call 1   Call end time  [phone]   Discharge diagnosis  Covid Positive   Meds reviewed with patient/caregiver?  Yes   Is the patient having any side effects they believe may be caused by any medication additions or changes?  No   Does the patient have all medications ordered at discharge?  Yes   Is the patient taking all medications as directed (includes completed medication regime)?  Yes   Does the patient have a primary care provider?   Yes   Does the patient have an appointment with their PCP or specialist within 7 days of discharge?  Yes   Has the patient kept scheduled appointments due by today?  Yes   Comments  Phone conference today with PCP.   Has home health visited the patient within 72 hours of discharge?  N/A   What DME was ordered?  Sent home w/ pulse ox   Has all DME been delivered?  No   Psychosocial issues?  No   Did the patient receive a copy of their discharge instructions?  Yes   Did the patient receive a copy of COVID-19 specific instructions?  Yes   Nursing interventions  Reviewed instructions with patient   What is the patient's perception of their health status since discharge?  Improving   Does the patient have any of the following symptoms?  Cough   Nursing Interventions  Nurse provided patient education   Pulse Ox monitoring  Intermittent   Pulse Ox device source  Patient   O2 Sat: education provided  Sat levels, Monitoring frequency, When to seek care   O2 Sat education comments  Seek care if 90% or below.   Is the patient/caregiver able to teach back steps to recovery at home?  Set small, achievable goals for return to baseline health, Rest and rebuild strength, gradually increase activity, Eat a well-balance diet, Make a list of questions for  provider's appointment   If the patient is a current smoker, are they able to teach back resources for cessation?  Not a smoker   Is the patient/caregiver able to teach back the hierarchy of who to call/visit for symptoms/problems? PCP, Specialist, Home health nurse, Urgent Care, ED, 911  Yes   COVID-19 call completed?  Yes   Wrap up additional comments  Encouraged changing toothbrush,Long, slow deep breaths.          Krupa Proctor RN

## 2020-11-10 ENCOUNTER — NURSE TRIAGE (OUTPATIENT)
Dept: CALL CENTER | Facility: HOSPITAL | Age: 70
End: 2020-11-10

## 2020-11-10 NOTE — TELEPHONE ENCOUNTER
Callsanjeev states that her mother is covid positive and she has questions about when it would be appropriate to bring her to the ER.  She states that she has spoken with her mother's PCP several times.  She has no severe symptoms but she does not want to drink enough, daughter is concerned about dehydration.  Discussed S/S of dehydration and when to return to the ER.  Questions answered.    Reason for Disposition  • [1] COVID-19 diagnosed by positive lab test AND [2] mild symptoms (e.g., cough, fever, others) AND [3] no complications or SOB    Additional Information  • Negative: SEVERE difficulty breathing (e.g., struggling for each breath, speaks in single words)  • Negative: Difficult to awaken or acting confused (e.g., disoriented, slurred speech)  • Negative: Bluish (or gray) lips or face now  • Negative: Shock suspected (e.g., cold/pale/clammy skin, too weak to stand, low BP, rapid pulse)  • Negative: Sounds like a life-threatening emergency to the triager  • Negative: [1] COVID-19 exposure AND [2] no symptoms  • Negative: COVID-19 and Breastfeeding, questions about  • Negative: [1] Adult with possible COVID-19 symptoms AND [2] triager concerned about severity of symptoms or other causes  • Negative: SEVERE or constant chest pain or pressure (Exception: mild central chest pain, present only when coughing)  • Negative: MODERATE difficulty breathing (e.g., speaks in phrases, SOB even at rest, pulse 100-120)  • Negative: Patient sounds very sick or weak to the triager  • Negative: MILD difficulty breathing (e.g., minimal/no SOB at rest, SOB with walking, pulse <100)  • Negative: Chest pain or pressure  • Negative: Fever > 103 F (39.4 C)  • Negative: [1] Fever > 101 F (38.3 C) AND [2] age > 60  • Negative: [1] Fever > 100.0 F (37.8 C) AND [2] bedridden (e.g., nursing home patient, CVA, chronic illness, recovering from surgery)  • Negative: HIGH RISK patient (e.g., age > 64 years, diabetes, heart or lung disease, weak  "immune system)  • Negative: Fever present > 3 days (72 hours)  • Negative: [1] Fever returns after gone for over 24 hours AND [2] symptoms worse or not improved  • Negative: [1] Continuous (nonstop) coughing interferes with work or school AND [2] no improvement using cough treatment per protocol  • Negative: [1] COVID-19 infection suspected by caller or triager AND [2] mild symptoms (cough, fever, or others) AND [3] no complications or SOB  • Negative: Cough present > 3 weeks  • Negative: [1] COVID-19 diagnosed by HCP (doctor, NP or PA) AND [2] mild symptoms (e.g., cough, fever, others) AND [3] no complications or SOB    Answer Assessment - Initial Assessment Questions  1. COVID-19 DIAGNOSIS: \"Who made your Coronavirus (COVID-19) diagnosis?\" \"Was it confirmed by a positive lab test?\" If not diagnosed by a HCP, ask \"Are there lots of cases (community spread) where you live?\" (See public health department website, if unsure)      Last week  2. ONSET: \"When did the COVID-19 symptoms start?\"        Last week  3. WORST SYMPTOM: \"What is your worst symptom?\" (e.g., cough, fever, shortness of breath, muscle aches)      weakness  4. COUGH: \"Do you have a cough?\" If so, ask: \"How bad is the cough?\"        Yes mild  5. FEVER: \"Do you have a fever?\" If so, ask: \"What is your temperature, how was it measured, and when did it start?\"      no  6. RESPIRATORY STATUS: \"Describe your breathing?\" (e.g., shortness of breath, wheezing, unable to speak)       ok  7. BETTER-SAME-WORSE: \"Are you getting better, staying the same or getting worse compared to yesterday?\"  If getting worse, ask, \"In what way?\"      Worse   8. HIGH RISK DISEASE: \"Do you have any chronic medical problems?\" (e.g., asthma, heart or lung disease, weak immune system, etc.)      yes  9. PREGNANCY: \"Is there any chance you are pregnant?\" \"When was your last menstrual period?\"      no  10. OTHER SYMPTOMS: \"Do you have any other symptoms?\"  (e.g., chills, fatigue, " headache, loss of smell or taste, muscle pain, sore throat)        Weakness and lightheaded    Protocols used: CORONAVIRUS (COVID-19) DIAGNOSED OR SUSPECTED-ADULT-

## 2021-01-05 LAB
ALBUMIN SERPL-MCNC: 4.1 G/DL (ref 3.5–5.2)
ALBUMIN/GLOB SERPL: 1.3 G/DL
ALP SERPL-CCNC: 97 U/L (ref 39–117)
ALT SERPL W P-5'-P-CCNC: 18 U/L (ref 1–33)
ANION GAP SERPL CALCULATED.3IONS-SCNC: 13 MMOL/L (ref 5–15)
AST SERPL-CCNC: 21 U/L (ref 1–32)
BACTERIA UR QL AUTO: NORMAL /HPF
BASOPHILS # BLD AUTO: 0.05 10*3/MM3 (ref 0–0.2)
BASOPHILS NFR BLD AUTO: 0.8 % (ref 0–1.5)
BILIRUB SERPL-MCNC: 0.4 MG/DL (ref 0–1.2)
BILIRUB UR QL STRIP: NEGATIVE
BUN SERPL-MCNC: 14 MG/DL (ref 8–23)
BUN/CREAT SERPL: 15.1 (ref 7–25)
CALCIUM SPEC-SCNC: 9.5 MG/DL (ref 8.6–10.5)
CHLORIDE SERPL-SCNC: 102 MMOL/L (ref 98–107)
CLARITY UR: CLEAR
CO2 SERPL-SCNC: 24 MMOL/L (ref 22–29)
COLOR UR: YELLOW
CREAT SERPL-MCNC: 0.93 MG/DL (ref 0.57–1)
D-LACTATE SERPL-SCNC: 2.3 MMOL/L (ref 0.5–2)
DEPRECATED RDW RBC AUTO: 45.6 FL (ref 37–54)
EOSINOPHIL # BLD AUTO: 0.19 10*3/MM3 (ref 0–0.4)
EOSINOPHIL NFR BLD AUTO: 3.1 % (ref 0.3–6.2)
ERYTHROCYTE [DISTWIDTH] IN BLOOD BY AUTOMATED COUNT: 13.3 % (ref 12.3–15.4)
GFR SERPL CREATININE-BSD FRML MDRD: 59 ML/MIN/1.73
GLOBULIN UR ELPH-MCNC: 3.2 GM/DL
GLUCOSE SERPL-MCNC: 165 MG/DL (ref 65–99)
GLUCOSE UR STRIP-MCNC: NEGATIVE MG/DL
HCT VFR BLD AUTO: 40.4 % (ref 34–46.6)
HGB BLD-MCNC: 12.8 G/DL (ref 12–15.9)
HGB UR QL STRIP.AUTO: ABNORMAL
HOLD SPECIMEN: NORMAL
HOLD SPECIMEN: NORMAL
HYALINE CASTS UR QL AUTO: NORMAL /LPF
IMM GRANULOCYTES # BLD AUTO: 0.01 10*3/MM3 (ref 0–0.05)
IMM GRANULOCYTES NFR BLD AUTO: 0.2 % (ref 0–0.5)
KETONES UR QL STRIP: NEGATIVE
LACTATE HOLD SPECIMEN: NORMAL
LEUKOCYTE ESTERASE UR QL STRIP.AUTO: ABNORMAL
LIPASE SERPL-CCNC: 22 U/L (ref 13–60)
LYMPHOCYTES # BLD AUTO: 2.45 10*3/MM3 (ref 0.7–3.1)
LYMPHOCYTES NFR BLD AUTO: 39.7 % (ref 19.6–45.3)
MCH RBC QN AUTO: 29.5 PG (ref 26.6–33)
MCHC RBC AUTO-ENTMCNC: 31.7 G/DL (ref 31.5–35.7)
MCV RBC AUTO: 93.1 FL (ref 79–97)
MONOCYTES # BLD AUTO: 0.31 10*3/MM3 (ref 0.1–0.9)
MONOCYTES NFR BLD AUTO: 5 % (ref 5–12)
NEUTROPHILS NFR BLD AUTO: 3.16 10*3/MM3 (ref 1.7–7)
NEUTROPHILS NFR BLD AUTO: 51.2 % (ref 42.7–76)
NITRITE UR QL STRIP: NEGATIVE
NRBC BLD AUTO-RTO: 0 /100 WBC (ref 0–0.2)
PH UR STRIP.AUTO: 5.5 [PH] (ref 5–8)
PLATELET # BLD AUTO: 231 10*3/MM3 (ref 140–450)
PMV BLD AUTO: 9.5 FL (ref 6–12)
POTASSIUM SERPL-SCNC: 3.6 MMOL/L (ref 3.5–5.2)
PROT SERPL-MCNC: 7.3 G/DL (ref 6–8.5)
PROT UR QL STRIP: NEGATIVE
RBC # BLD AUTO: 4.34 10*6/MM3 (ref 3.77–5.28)
RBC # UR: NORMAL /HPF
REF LAB TEST METHOD: NORMAL
SODIUM SERPL-SCNC: 139 MMOL/L (ref 136–145)
SP GR UR STRIP: 1.02 (ref 1–1.03)
SQUAMOUS #/AREA URNS HPF: NORMAL /HPF
UROBILINOGEN UR QL STRIP: ABNORMAL
WBC # BLD AUTO: 6.17 10*3/MM3 (ref 3.4–10.8)
WBC UR QL AUTO: NORMAL /HPF
WHOLE BLOOD HOLD SPECIMEN: NORMAL
WHOLE BLOOD HOLD SPECIMEN: NORMAL

## 2021-01-05 PROCEDURE — 83605 ASSAY OF LACTIC ACID: CPT

## 2021-01-05 PROCEDURE — 96374 THER/PROPH/DIAG INJ IV PUSH: CPT

## 2021-01-05 PROCEDURE — 99283 EMERGENCY DEPT VISIT LOW MDM: CPT

## 2021-01-05 PROCEDURE — 96375 TX/PRO/DX INJ NEW DRUG ADDON: CPT

## 2021-01-05 PROCEDURE — 80053 COMPREHEN METABOLIC PANEL: CPT

## 2021-01-05 PROCEDURE — 81001 URINALYSIS AUTO W/SCOPE: CPT

## 2021-01-05 PROCEDURE — 85025 COMPLETE CBC W/AUTO DIFF WBC: CPT

## 2021-01-05 PROCEDURE — 83690 ASSAY OF LIPASE: CPT

## 2021-01-05 RX ORDER — SODIUM CHLORIDE 9 MG/ML
10 INJECTION INTRAVENOUS AS NEEDED
Status: DISCONTINUED | OUTPATIENT
Start: 2021-01-05 | End: 2021-01-06 | Stop reason: HOSPADM

## 2021-01-06 ENCOUNTER — APPOINTMENT (OUTPATIENT)
Dept: CT IMAGING | Facility: HOSPITAL | Age: 71
End: 2021-01-06

## 2021-01-06 ENCOUNTER — HOSPITAL ENCOUNTER (EMERGENCY)
Facility: HOSPITAL | Age: 71
Discharge: HOME | End: 2021-01-06
Attending: EMERGENCY MEDICINE | Admitting: EMERGENCY MEDICINE

## 2021-01-06 VITALS
OXYGEN SATURATION: 90 % | WEIGHT: 190 LBS | SYSTOLIC BLOOD PRESSURE: 132 MMHG | HEIGHT: 63 IN | TEMPERATURE: 97 F | RESPIRATION RATE: 22 BRPM | HEART RATE: 56 BPM | DIASTOLIC BLOOD PRESSURE: 76 MMHG | BODY MASS INDEX: 33.66 KG/M2

## 2021-01-06 DIAGNOSIS — R10.9 ACUTE RIGHT FLANK PAIN: Primary | ICD-10-CM

## 2021-01-06 LAB
D DIMER PPP FEU-MCNC: 1.6 MCGFEU/ML (ref 0–0.56)
D-LACTATE SERPL-SCNC: 0.8 MMOL/L (ref 0.5–2)

## 2021-01-06 PROCEDURE — 25010000002 MORPHINE PER 10 MG: Performed by: EMERGENCY MEDICINE

## 2021-01-06 PROCEDURE — 83605 ASSAY OF LACTIC ACID: CPT | Performed by: EMERGENCY MEDICINE

## 2021-01-06 PROCEDURE — 85379 FIBRIN DEGRADATION QUANT: CPT | Performed by: EMERGENCY MEDICINE

## 2021-01-06 PROCEDURE — 25010000002 KETOROLAC TROMETHAMINE PER 15 MG: Performed by: EMERGENCY MEDICINE

## 2021-01-06 PROCEDURE — 71275 CT ANGIOGRAPHY CHEST: CPT

## 2021-01-06 PROCEDURE — 96375 TX/PRO/DX INJ NEW DRUG ADDON: CPT

## 2021-01-06 PROCEDURE — 96374 THER/PROPH/DIAG INJ IV PUSH: CPT

## 2021-01-06 PROCEDURE — 74177 CT ABD & PELVIS W/CONTRAST: CPT

## 2021-01-06 PROCEDURE — 0 IOPAMIDOL PER 1 ML: Performed by: EMERGENCY MEDICINE

## 2021-01-06 PROCEDURE — 25010000002 ONDANSETRON PER 1 MG: Performed by: EMERGENCY MEDICINE

## 2021-01-06 RX ORDER — MORPHINE SULFATE 2 MG/ML
2 INJECTION, SOLUTION INTRAMUSCULAR; INTRAVENOUS ONCE
Status: COMPLETED | OUTPATIENT
Start: 2021-01-06 | End: 2021-01-06

## 2021-01-06 RX ORDER — ONDANSETRON 2 MG/ML
4 INJECTION INTRAMUSCULAR; INTRAVENOUS ONCE
Status: COMPLETED | OUTPATIENT
Start: 2021-01-06 | End: 2021-01-06

## 2021-01-06 RX ORDER — KETOROLAC TROMETHAMINE 15 MG/ML
15 INJECTION, SOLUTION INTRAMUSCULAR; INTRAVENOUS ONCE
Status: COMPLETED | OUTPATIENT
Start: 2021-01-06 | End: 2021-01-06

## 2021-01-06 RX ADMIN — MORPHINE SULFATE 2 MG: 2 INJECTION, SOLUTION INTRAMUSCULAR; INTRAVENOUS at 00:45

## 2021-01-06 RX ADMIN — IOPAMIDOL 40 ML: 755 INJECTION, SOLUTION INTRAVENOUS at 03:49

## 2021-01-06 RX ADMIN — KETOROLAC TROMETHAMINE 15 MG: 15 INJECTION, SOLUTION INTRAMUSCULAR; INTRAVENOUS at 04:49

## 2021-01-06 RX ADMIN — IOPAMIDOL 100 ML: 755 INJECTION, SOLUTION INTRAVENOUS at 01:14

## 2021-01-06 RX ADMIN — ONDANSETRON 4 MG: 2 INJECTION INTRAMUSCULAR; INTRAVENOUS at 00:45

## 2021-01-06 NOTE — ED PROVIDER NOTES
EMERGENCY DEPARTMENT ENCOUNTER      Pt Name: Katerin Pineda  MRN: [id number]  YOB: 1950  Date of evaluation: 1/5/2021  Provider: Chase Escobar MD    CHIEF COMPLAINT       Chief Complaint   Patient presents with   • Flank Pain         HISTORY OF PRESENT ILLNESS  (Location/Symptom, Timing/Onset, Context/Setting, Quality, Duration, Modifying Factors, Severity.)   Katerin Pineda is a 70 y.o. female who presents to the emergency department with acute onset flank pain for the past 2 days. Right flank, moderate intensity, aching in character, worse with movement and palpation without any other associated symptoms including no chest pain, shortness of breath, fever, chills, cough, andressa abdominal pain, vomiting, diarrhea, or urinary symptoms. No previous history of nephrolithiasis and no other previous abdominal surgeries. Denies any other injury.      Nursing notes were reviewed.    REVIEW OF SYSTEMS    (2-9 systems for level 4, 10 or more for level 5)   ROS:  General:  No fevers, no chills, no weakness  Cardiovascular:  No chest pain, no palpitations  Respiratory:  No shortness of breath, no cough, no wheezing  Gastrointestinal: Right flank pain  Musculoskeletal:  No muscle pain, no joint pain  Skin:  No rash, no easy bruising  Neurologic:  No speech problems, no headache, no extremity numbness, no extremity tingling, no extremity weakness  Psychiatric:  No anxiety  Genitourinary:  No dysuria, no hematuria    Except as noted above the remainder of the review of systems was reviewed and negative.       PAST MEDICAL HISTORY     Past Medical History:   Diagnosis Date   • Anxiety    • Disease of thyroid gland    • Hyperlipidemia    • Hypertension    • Sleep apnea          SURGICAL HISTORY       Past Surgical History:   Procedure Laterality Date   • KNEE ARTHROPLASTY, PARTIAL REPLACEMENT Left    • OTHER SURGICAL HISTORY      blsdder control implant   • UVULOPALATOPHARYNGOPLASTY           CURRENT MEDICATIONS        Current Facility-Administered Medications:   •  Sodium Chloride (PF) 0.9 % 10 mL, 10 mL, Intravenous, PRN, Chase Escobar MD    Current Outpatient Medications:   •  amLODIPine (NORVASC) 5 MG tablet, amlodipine 5 mg tablet  TAKE 1 TABLET EVERY DAY, Disp: , Rfl:   •  benazepril (LOTENSIN) 40 MG tablet, Take 40 mg by mouth Daily., Disp: , Rfl:   •  Diclofenac Sodium (VOLTAREN) 1 % gel gel, Apply 4 g topically to the appropriate area as directed 3 (Three) Times a Day., Disp: 100 g, Rfl: 0  •  ezetimibe (ZETIA) 10 MG tablet, ezetimibe 10 mg tablet  1 qd, Disp: , Rfl:   •  levothyroxine (SYNTHROID, LEVOTHROID) 75 MCG tablet, levothyroxine 75 mcg tablet  1 Daily, Disp: , Rfl:   •  metFORMIN (GLUCOPHAGE) 500 MG tablet, metformin 500 mg tablet  TAKE 1 TABLET TWICE DAILY, Disp: , Rfl:   •  naproxen (EC NAPROSYN) 500 MG EC tablet, Take 1 tablet by mouth 2 (Two) Times a Day As Needed for Mild Pain ., Disp: 12 tablet, Rfl: 0  •  pantoprazole (PROTONIX) 40 MG EC tablet, Take 1 tablet by mouth Daily., Disp: 30 tablet, Rfl: 0  •  pravastatin (PRAVACHOL) 40 MG tablet, pravastatin 40 mg tablet  Take 1 tablet every day by oral route., Disp: , Rfl:   •  sertraline (ZOLOFT) 100 MG tablet, Take 100 mg by mouth Daily., Disp: , Rfl:     ALLERGIES     Patient has no known allergies.    FAMILY HISTORY       Family History   Problem Relation Age of Onset   • No Known Problems Mother    • Heart attack Father 45   • Coronary artery disease Sister 60   • Cancer Brother    • No Known Problems Maternal Grandmother    • No Known Problems Maternal Grandfather    • No Known Problems Paternal Grandmother    • No Known Problems Paternal Grandfather    • David Parkinson White syndrome Other           SOCIAL HISTORY       Social History     Socioeconomic History   • Marital status:      Spouse name: Not on file   • Number of children: Not on file   • Years of education: Not on file   • Highest education level: Not on file   Social Needs    • Financial resource strain: Not on file   • Food insecurity     Worry: Never true     Inability: Never true   • Transportation needs     Medical: No     Non-medical: No   Tobacco Use   • Smoking status: Never Smoker   • Smokeless tobacco: Never Used   Substance and Sexual Activity   • Alcohol use: Not Currently   • Drug use: Never   • Sexual activity: Defer   Social History Narrative    Caffeine: Seldom 2 glasses in week         PHYSICAL EXAM    (up to 7 for level 4, 8 or more for level 5)     Vitals:    01/06/21 0500 01/06/21 0515 01/06/21 0530 01/06/21 0547   BP:    132/76   BP Location:    Right arm   Patient Position:    Lying   Pulse: (!) 49 50 56    Resp:       Temp:       TempSrc:       SpO2: 94% 92% 90%    Weight:       Height:           Physical Exam  General: Awake, alert, no acute distress.  HEENT: Conjunctiva normal.  Neck: Trachea midline.  Cardiac: Heart regular rate, rhythm, no murmurs, rubs, or gallops  Lungs: Lungs are clear to auscultation, there is no wheezing, rhonchi, or rales. There is no use of accessory muscles.  Chest wall: There is no tenderness to palpation over the chest wall or over ribs  Abdomen: Abdomen is soft, nontender, nondistended. There are no firm or pulsatile masses, no rebound rigidity or guarding.  Moderate tenderness over the R flank.  Musculoskeletal: No deformity.  Neuro: Alert and oriented x 4.  Dermatology: Skin is warm and dry  Psych: Mentation is grossly normal, cognition is grossly normal. Affect is appropriate.        DIAGNOSTIC RESULTS   RADIOLOGY:   Non-plain film images such as CT, Ultrasound and MRI are read by the radiologist. Plain radiographic images are visualized and preliminarily interpreted by the emergency physician with the below findings:      [x] Radiologist's Report Reviewed:  CT Angiogram Chest   Final Result   1. Negative for pulmonary embolus.   2. Negative for thoracic aortic aneurysm/dissection.   3. No acute pulmonary process.      Signer  Name: Rajan Roberts MD    Signed: 1/6/2021 4:02 AM    Workstation Name: [facility]     [facility]      CT Abdomen Pelvis With Contrast   Final Result      1. No acute abdominal or pelvic findings.   2. Normal appendix.   3. Bilateral renal cysts. No hydronephrosis or visible obstructing urinary tract stones.   4. Moderate stool burden.               Signer Name: Rajan Roberts MD    Signed: 1/6/2021 1:27 AM    Workstation Name: [facility]     [facility]          LABS:    I have reviewed and interpreted all of the currently available lab results from this visit (if applicable):  Results for orders placed or performed during the hospital encounter of 01/06/21   Comprehensive Metabolic Panel    Specimen: Blood   Result Value Ref Range    Glucose 165 (H) 65 - 99 mg/dL    BUN 14 8 - 23 mg/dL    Creatinine 0.93 0.57 - 1.00 mg/dL    Sodium 139 136 - 145 mmol/L    Potassium 3.6 3.5 - 5.2 mmol/L    Chloride 102 98 - 107 mmol/L    CO2 24.0 22.0 - 29.0 mmol/L    Calcium 9.5 8.6 - 10.5 mg/dL    Total Protein 7.3 6.0 - 8.5 g/dL    Albumin 4.10 3.50 - 5.20 g/dL    ALT (SGPT) 18 1 - 33 U/L    AST (SGOT) 21 1 - 32 U/L    Alkaline Phosphatase 97 39 - 117 U/L    Total Bilirubin 0.4 0.0 - 1.2 mg/dL    eGFR Non African Amer 59 (L) >60 mL/min/1.73    Globulin 3.2 gm/dL    A/G Ratio 1.3 g/dL    BUN/Creatinine Ratio 15.1 7.0 - 25.0    Anion Gap 13.0 5.0 - 15.0 mmol/L   Lipase    Specimen: Blood   Result Value Ref Range    Lipase 22 13 - 60 U/L   Urinalysis With Microscopic If Indicated (No Culture) - Urine, Clean Catch    Specimen: Urine, Clean Catch   Result Value Ref Range    Color, UA Yellow Yellow, Straw    Appearance, UA Clear Clear    pH, UA 5.5 5.0 - 8.0    Specific Gravity, UA 1.016 1.001 - 1.030    Glucose, UA Negative Negative    Ketones, UA Negative Negative    Bilirubin, UA Negative Negative    Blood, UA Trace (A) Negative    Protein, UA Negative Negative    Leuk Esterase,  UA Trace (A) Negative    Nitrite, UA Negative Negative    Urobilinogen, UA 0.2 E.U./dL 0.2 - 1.0 E.U./dL   Lactic Acid, Plasma    Specimen: Blood   Result Value Ref Range    Lactate 2.3 (C) 0.5 - 2.0 mmol/L   CBC Auto Differential    Specimen: Blood   Result Value Ref Range    WBC 6.17 3.40 - 10.80 10*3/mm3    RBC 4.34 3.77 - 5.28 10*6/mm3    Hemoglobin 12.8 12.0 - 15.9 g/dL    Hematocrit 40.4 34.0 - 46.6 %    MCV 93.1 79.0 - 97.0 fL    MCH 29.5 26.6 - 33.0 pg    MCHC 31.7 31.5 - 35.7 g/dL    RDW 13.3 12.3 - 15.4 %    RDW-SD 45.6 37.0 - 54.0 fl    MPV 9.5 6.0 - 12.0 fL    Platelets 231 140 - 450 10*3/mm3    Neutrophil % 51.2 42.7 - 76.0 %    Lymphocyte % 39.7 19.6 - 45.3 %    Monocyte % 5.0 5.0 - 12.0 %    Eosinophil % 3.1 0.3 - 6.2 %    Basophil % 0.8 0.0 - 1.5 %    Immature Grans % 0.2 0.0 - 0.5 %    Neutrophils, Absolute 3.16 1.70 - 7.00 10*3/mm3    Lymphocytes, Absolute 2.45 0.70 - 3.10 10*3/mm3    Monocytes, Absolute 0.31 0.10 - 0.90 10*3/mm3    Eosinophils, Absolute 0.19 0.00 - 0.40 10*3/mm3    Basophils, Absolute 0.05 0.00 - 0.20 10*3/mm3    Immature Grans, Absolute 0.01 0.00 - 0.05 10*3/mm3    nRBC 0.0 0.0 - 0.2 /100 WBC   Lactic Acid, Reflex Timer (This will reflex a repeat order 3-3:15 hours after ordered.)    Specimen: Blood   Result Value Ref Range    Hold Tube Hold for add-ons.    Urinalysis, Microscopic Only - Urine, Clean Catch    Specimen: Urine, Clean Catch   Result Value Ref Range    RBC, UA 0-2 None Seen, 0-2 /HPF    WBC, UA 0-2 None Seen, 0-2 /HPF    Bacteria, UA None Seen None Seen, Trace /HPF    Squamous Epithelial Cells, UA 0-2 None Seen, 0-2 /HPF    Hyaline Casts, UA None Seen 0 - 6 /LPF    Methodology Automated Microscopy    Lactic Acid, Reflex    Specimen: Blood   Result Value Ref Range    Lactate 0.8 0.5 - 2.0 mmol/L   D-dimer, Quantitative    Specimen: Blood   Result Value Ref Range    D-Dimer, Quantitative 1.60 (H) 0.00 - 0.56 MCGFEU/mL   Light Blue Top   Result Value Ref Range    Extra  Tube hold for add-on    Green Top (Gel)   Result Value Ref Range    Extra Tube Hold for add-ons.    Lavender Top   Result Value Ref Range    Extra Tube hold for add-on    Gold Top - SST   Result Value Ref Range    Extra Tube Hold for add-ons.         All other labs were within normal range or not returned as of this dictation.      EMERGENCY DEPARTMENT COURSE and DIFFERENTIAL DIAGNOSIS/MDM:   Vitals:    Vitals:    01/06/21 0500 01/06/21 0515 01/06/21 0530 01/06/21 0547   BP:    132/76   BP Location:    Right arm   Patient Position:    Lying   Pulse: (!) 49 50 56    Resp:       Temp:       TempSrc:       SpO2: 94% 92% 90%    Weight:       Height:           ED Course as of Jan 06 0705 Wed Jan 06, 2021 0410 All results reviewed and are negative.  There is no significant laboratory abnormality including no evidence of significant leukocytosis, anemia, or electrolyte derangement.  She did have mild lactic acidosis at first that is likely secondary to collection error, however this cleared easily on reflex.  CTA chest demonstrates no evidence of PE, pneumothorax, rib fracture.  CT scan of the abdomen and pelvis demonstrates no renal stone, biliary pathology, vascular pathology such as mesenteric ischemia/AAA/renal infarct, appendicitis, or other significant abnormality.  This is likely musculoskeletal in nature and patient will be discharged home with instructions for symptomatic management.    [NS]      ED Course User Index  [NS] Chase Escobar MD       Patient very well-appearing throughout the duration of her stay in the emergency department. Suspect this is likely musculoskeletal in nature as it is exacerbated with movement and is exactly reproducible on palpation. Extensive work-up reveals no evidence of emergent pathology. CTA chest is negative for PE, pneumonia, pneumothorax, or other emergent chest process including dissection. CT abdomen and pelvis negative for acute biliary pathology, AAA, mesenteric  ischemia, or appendicitis. Pain well controlled with IV morphine and Toradol in the ED and she'll be discharged to follow-up with her primary care provider.    I had a discussion with the patient/family regarding diagnosis, diagnostic results, treatment plan, and medications.  The patient/family indicated understanding of these instructions.  I spent adequate time at the bedside preceding discharge necessary to personally discuss the aftercare instructions, giving patient education, providing explanations of the results of our evaluations/findings, and my decision making to assure that the patient/family understand the plan of care.  Time was allotted to answer questions at that time and throughout the ED course.  Emphasis was placed on timely follow-up after discharge.  I also discussed the potential for the development of an acute emergent condition requiring further evaluation, admission, or even surgical intervention. I discussed that we found nothing during the visit today indicating the need for further workup, admission, or the presence of an unstable medical condition.  I encouraged the patient to return to the emergency department immediately for ANY concerns, worsening, new complaints, or if symptoms persist and unable to seek follow-up in a timely fashion.  The patient/family expressed understanding and agreement with this plan.  The patient will follow-up with their PCP in 1-2 days for reevaluation.       MEDICATIONS ADMINISTERED IN ED:  Medications   Sodium Chloride (PF) 0.9 % 10 mL (has no administration in time range)   morphine injection 2 mg (2 mg Intravenous Given 1/6/21 0045)   ondansetron (ZOFRAN) injection 4 mg (4 mg Intravenous Given 1/6/21 0045)   iopamidol (ISOVUE-370) 76 % injection 100 mL (100 mL Intravenous Given 1/6/21 0114)   iopamidol (ISOVUE-370) 76 % injection 100 mL (40 mL Intravenous Given 1/6/21 0349)   ketorolac (TORADOL) injection 15 mg (15 mg Intravenous Given 1/6/21 6299)            FINAL IMPRESSION      1. Acute right flank pain          DISPOSITION/PLAN     ED Disposition     ED Disposition Condition Comment    Discharge Stable           PATIENT REFERRED TO:  Houston Diamond MD  [address]  [facility] [id number] [phone]    Schedule an appointment as soon as possible for a visit in 1 day      [facility]  [address] [facility] [address] [phone]    If symptoms worsen      DISCHARGE MEDICATIONS:     Medication List      START taking these medications    Diclofenac Sodium 1 % gel gel  Commonly known as: VOLTAREN  Apply 4 g topically to the appropriate area as directed 3 (Three) Times a Day.        CONTINUE taking these medications    amLODIPine 5 MG tablet  Commonly known as: NORVASC     benazepril 40 MG tablet  Commonly known as: LOTENSIN     ezetimibe 10 MG tablet  Commonly known as: ZETIA     levothyroxine 75 MCG tablet  Commonly known as: SYNTHROID, LEVOTHROID     metFORMIN 500 MG tablet  Commonly known as: GLUCOPHAGE     naproxen 500 MG EC tablet  Commonly known as: EC NAPROSYN  Take 1 tablet by mouth 2 (Two) Times a Day As Needed for Mild Pain .     pantoprazole 40 MG EC tablet  Commonly known as: PROTONIX  Take 1 tablet by mouth Daily.     pravastatin 40 MG tablet  Commonly known as: PRAVACHOL     sertraline 100 MG tablet  Commonly known as: ZOLOFT           Where to Get Your Medications      These medications were sent to [address] - [phone]  - [phone] [place] [address]    Phone: [phone]   · Diclofenac Sodium 1 % gel gel             Comment: Please note this report has been produced using speech recognition software.      Chase Escobar MD  Attending Emergency Physician               Chase Escobar MD  01/06/21 0705

## 2021-01-30 ENCOUNTER — APPOINTMENT (OUTPATIENT)
Dept: CT IMAGING | Facility: HOSPITAL | Age: 71
End: 2021-01-30

## 2021-01-30 ENCOUNTER — HOSPITAL ENCOUNTER (EMERGENCY)
Facility: HOSPITAL | Age: 71
Discharge: HOME | End: 2021-01-30
Attending: EMERGENCY MEDICINE | Admitting: EMERGENCY MEDICINE

## 2021-01-30 VITALS
RESPIRATION RATE: 18 BRPM | WEIGHT: 200 LBS | DIASTOLIC BLOOD PRESSURE: 122 MMHG | BODY MASS INDEX: 35.44 KG/M2 | SYSTOLIC BLOOD PRESSURE: 220 MMHG | HEART RATE: 82 BPM | HEIGHT: 63 IN | OXYGEN SATURATION: 95 % | TEMPERATURE: 98.1 F

## 2021-01-30 DIAGNOSIS — R10.9 RIGHT FLANK PAIN: ICD-10-CM

## 2021-01-30 DIAGNOSIS — M79.18 MUSCULOSKELETAL PAIN: Primary | ICD-10-CM

## 2021-01-30 LAB
ALBUMIN SERPL-MCNC: 4.4 G/DL (ref 3.5–5.2)
ALBUMIN/GLOB SERPL: 1.3 G/DL
ALP SERPL-CCNC: 102 U/L (ref 39–117)
ALT SERPL W P-5'-P-CCNC: 17 U/L (ref 1–33)
ANION GAP SERPL CALCULATED.3IONS-SCNC: 14 MMOL/L (ref 5–15)
AST SERPL-CCNC: 20 U/L (ref 1–32)
BASOPHILS # BLD AUTO: 0.05 10*3/MM3 (ref 0–0.2)
BASOPHILS NFR BLD AUTO: 0.8 % (ref 0–1.5)
BILIRUB SERPL-MCNC: 0.3 MG/DL (ref 0–1.2)
BUN SERPL-MCNC: 20 MG/DL (ref 8–23)
BUN/CREAT SERPL: 20.6 (ref 7–25)
CALCIUM SPEC-SCNC: 9.2 MG/DL (ref 8.6–10.5)
CHLORIDE SERPL-SCNC: 110 MMOL/L (ref 98–107)
CO2 SERPL-SCNC: 23 MMOL/L (ref 22–29)
CREAT SERPL-MCNC: 0.97 MG/DL (ref 0.57–1)
DEPRECATED RDW RBC AUTO: 45.5 FL (ref 37–54)
EOSINOPHIL # BLD AUTO: 0.22 10*3/MM3 (ref 0–0.4)
EOSINOPHIL NFR BLD AUTO: 3.5 % (ref 0.3–6.2)
ERYTHROCYTE [DISTWIDTH] IN BLOOD BY AUTOMATED COUNT: 13.4 % (ref 12.3–15.4)
GFR SERPL CREATININE-BSD FRML MDRD: 56 ML/MIN/1.73
GLOBULIN UR ELPH-MCNC: 3.3 GM/DL
GLUCOSE SERPL-MCNC: 98 MG/DL (ref 65–99)
HCT VFR BLD AUTO: 40.4 % (ref 34–46.6)
HGB BLD-MCNC: 13.2 G/DL (ref 12–15.9)
IMM GRANULOCYTES # BLD AUTO: 0.01 10*3/MM3 (ref 0–0.05)
IMM GRANULOCYTES NFR BLD AUTO: 0.2 % (ref 0–0.5)
LYMPHOCYTES # BLD AUTO: 2.96 10*3/MM3 (ref 0.7–3.1)
LYMPHOCYTES NFR BLD AUTO: 46.7 % (ref 19.6–45.3)
MCH RBC QN AUTO: 30.3 PG (ref 26.6–33)
MCHC RBC AUTO-ENTMCNC: 32.7 G/DL (ref 31.5–35.7)
MCV RBC AUTO: 92.7 FL (ref 79–97)
MONOCYTES # BLD AUTO: 0.43 10*3/MM3 (ref 0.1–0.9)
MONOCYTES NFR BLD AUTO: 6.8 % (ref 5–12)
NEUTROPHILS NFR BLD AUTO: 2.67 10*3/MM3 (ref 1.7–7)
NEUTROPHILS NFR BLD AUTO: 42 % (ref 42.7–76)
NRBC BLD AUTO-RTO: 0 /100 WBC (ref 0–0.2)
PLATELET # BLD AUTO: 225 10*3/MM3 (ref 140–450)
PMV BLD AUTO: 9.6 FL (ref 6–12)
POTASSIUM SERPL-SCNC: 4.2 MMOL/L (ref 3.5–5.2)
PROT SERPL-MCNC: 7.7 G/DL (ref 6–8.5)
RBC # BLD AUTO: 4.36 10*6/MM3 (ref 3.77–5.28)
SODIUM SERPL-SCNC: 147 MMOL/L (ref 136–145)
WBC # BLD AUTO: 6.34 10*3/MM3 (ref 3.4–10.8)

## 2021-01-30 PROCEDURE — 25010000002 KETOROLAC TROMETHAMINE PER 15 MG: Performed by: PHYSICIAN ASSISTANT

## 2021-01-30 PROCEDURE — 80053 COMPREHEN METABOLIC PANEL: CPT | Performed by: PHYSICIAN ASSISTANT

## 2021-01-30 PROCEDURE — 96372 THER/PROPH/DIAG INJ SC/IM: CPT

## 2021-01-30 PROCEDURE — 99283 EMERGENCY DEPT VISIT LOW MDM: CPT

## 2021-01-30 PROCEDURE — 85025 COMPLETE CBC W/AUTO DIFF WBC: CPT | Performed by: PHYSICIAN ASSISTANT

## 2021-01-30 RX ORDER — KETOROLAC TROMETHAMINE 30 MG/ML
30 INJECTION, SOLUTION INTRAMUSCULAR; INTRAVENOUS ONCE
Status: COMPLETED | OUTPATIENT
Start: 2021-01-30 | End: 2021-01-30

## 2021-01-30 RX ORDER — CYCLOBENZAPRINE HCL 5 MG
5 TABLET ORAL 3 TIMES DAILY PRN
Qty: 15 TABLET | Refills: 0 | Status: SHIPPED | OUTPATIENT
Start: 2021-01-30

## 2021-01-30 RX ORDER — LIDOCAINE 50 MG/G
1 PATCH TOPICAL EVERY 24 HOURS
Qty: 15 PATCH | Refills: 0 | Status: SHIPPED | OUTPATIENT
Start: 2021-01-30

## 2021-01-30 RX ADMIN — KETOROLAC TROMETHAMINE 30 MG: 30 INJECTION, SOLUTION INTRAMUSCULAR; INTRAVENOUS at 21:17

## 2025-06-14 ENCOUNTER — APPOINTMENT (OUTPATIENT)
Facility: HOSPITAL | Age: 75
End: 2025-06-14
Payer: MEDICARE

## 2025-06-14 ENCOUNTER — HOSPITAL ENCOUNTER (EMERGENCY)
Facility: HOSPITAL | Age: 75
Discharge: HOME | End: 2025-06-14
Attending: EMERGENCY MEDICINE
Payer: MEDICARE

## 2025-06-14 VITALS
WEIGHT: 180 LBS | SYSTOLIC BLOOD PRESSURE: 130 MMHG | RESPIRATION RATE: 18 BRPM | DIASTOLIC BLOOD PRESSURE: 73 MMHG | TEMPERATURE: 98.2 F | HEIGHT: 63 IN | BODY MASS INDEX: 31.89 KG/M2 | OXYGEN SATURATION: 97 % | HEART RATE: 66 BPM

## 2025-06-14 DIAGNOSIS — M25.562 ACUTE PAIN OF LEFT KNEE: Primary | ICD-10-CM

## 2025-06-14 PROCEDURE — 25010000002 KETOROLAC TROMETHAMINE PER 15 MG: Performed by: EMERGENCY MEDICINE

## 2025-06-14 PROCEDURE — 99283 EMERGENCY DEPT VISIT LOW MDM: CPT | Performed by: EMERGENCY MEDICINE

## 2025-06-14 PROCEDURE — 96372 THER/PROPH/DIAG INJ SC/IM: CPT

## 2025-06-14 PROCEDURE — 73560 X-RAY EXAM OF KNEE 1 OR 2: CPT

## 2025-06-14 RX ORDER — KETOROLAC TROMETHAMINE 15 MG/ML
15 INJECTION, SOLUTION INTRAMUSCULAR; INTRAVENOUS ONCE
Status: COMPLETED | OUTPATIENT
Start: 2025-06-14 | End: 2025-06-14

## 2025-06-14 RX ORDER — HYDROCODONE BITARTRATE AND ACETAMINOPHEN 5; 325 MG/1; MG/1
1 TABLET ORAL 4 TIMES DAILY PRN
Qty: 14 TABLET | Refills: 0 | Status: SHIPPED | OUTPATIENT
Start: 2025-06-14 | End: 2025-06-18 | Stop reason: HOSPADM

## 2025-06-14 RX ORDER — HYDROCODONE BITARTRATE AND ACETAMINOPHEN 5; 325 MG/1; MG/1
1 TABLET ORAL ONCE
Refills: 0 | Status: COMPLETED | OUTPATIENT
Start: 2025-06-14 | End: 2025-06-14

## 2025-06-14 RX ADMIN — KETOROLAC TROMETHAMINE 15 MG: 15 INJECTION, SOLUTION INTRAMUSCULAR; INTRAVENOUS at 16:49

## 2025-06-14 RX ADMIN — HYDROCODONE BITARTRATE AND ACETAMINOPHEN 1 TABLET: 5; 325 TABLET ORAL at 16:48

## 2025-06-14 NOTE — FSED PROVIDER NOTE
"Subjective  History of Present Illness:    Patient is a 74-year-old female presents with left knee pain after she was stepping into a car and missed stepped and her knee twisted.  Patient states she has not been wanting to bear weight due to pain since this occurred earlier this afternoon.  Patient states she not hit her head or lose consciousness, denies any other pain from the injury.  No pain to palpation or pain in the calf, ankle, foot, hip.      Nurses Notes reviewed and agree, including vitals, allergies, social history and prior medical history.     REVIEW OF SYSTEMS: All systems reviewed and not pertinent unless noted.  Review of Systems    Past Medical History:   Diagnosis Date    Anxiety     Disease of thyroid gland     Hyperlipidemia     Hypertension     Sleep apnea        Allergies:    Patient has no known allergies.      Past Surgical History:   Procedure Laterality Date    KNEE ARTHROPLASTY, PARTIAL REPLACEMENT Left     OTHER SURGICAL HISTORY      blsdder control implant    UVULOPALATOPHARYNGOPLASTY      WRIST SURGERY Right          Social History     Socioeconomic History    Marital status:    Tobacco Use    Smoking status: Never    Smokeless tobacco: Never   Substance and Sexual Activity    Alcohol use: Not Currently    Drug use: Never    Sexual activity: Defer         Family History   Problem Relation Age of Onset    No Known Problems Mother     Heart attack Father 45    Coronary artery disease Sister 60    Cancer Brother     No Known Problems Maternal Grandmother     No Known Problems Maternal Grandfather     No Known Problems Paternal Grandmother     No Known Problems Paternal Grandfather     David Parkinson White syndrome Other        Objective  Physical Exam:  /73   Pulse 66   Temp 98.2 °F (36.8 °C) (Oral)   Resp 18   Ht 160 cm (63\")   Wt 81.6 kg (180 lb)   SpO2 97%   BMI 31.89 kg/m²      Physical Exam  Constitutional:       Appearance: Well-developed. No acute " distress  HENT:      Head: Normocephalic and atraumatic.      Mouth/Throat:      Mouth: Mucous membranes are moist.      Eyes:      Extraocular Movements: Extraocular movements intact.   Cardiovascular:      Rate and Rhythm: Normal rate and regular rhythm.      Heart sounds: Normal heart sounds.   Pulmonary:      Effort: Pulmonary effort is normal. No respiratory distress.      Breath sounds: Normal breath sounds.   Abdominal:      General: There is no distension.      Palpations: Abdomen is soft and nontender. No rebound tenderness or guarding noted  Musculoskeletal:         General: Pain to palpation left medial knee.  Mild tenderness palpation along the meniscal line.  2+ pulses left DP, PT.  Flexion range of motion but flexion elicits pain of the knee.  Flecked range of motion of hip, foot and ankle.  Patient able to stand up and bear weight but elicits pain.      Extremities: Moves all 4s   Skin:     General: Skin is warm and dry.  No overlying open skin wounds or skin discoloration of the knee.     Capillary Refill: Capillary refill takes less than 2 seconds.   Neurological:      Mental Status:Alert and oriented to person, place, and time.   Mentation is normal   Psychiatric:         Mood and Affect: Mood normal.         Behavior: Behavior normal.     Procedures    ED Course:         Lab Results (last 24 hours)       ** No results found for the last 24 hours. **             XR Knee 1 or 2 View Left  Result Date: 6/14/2025  XR KNEE 1 OR 2 VW LEFT Date of Exam: 6/14/2025 3:54 PM EDT Indication: fall, knee buckling Comparison: None available. Findings: Prior medial compartment knee arthroplasty. Mild degenerative changes in the patellofemoral and lateral compartments with osteophyte formation. No evidence of acute fracture or joint dislocation. Small joint effusion.     Impression: Impression: Unicompartmental knee prosthetic without hardware complication or acute osseous injury. Small joint effusion. Mild  degenerative changes in the nonoperative compartments. Electronically Signed: Denzel Swanson MD  6/14/2025 4:14 PM EDT  Workstation ID: [id number]         [facility]      Initial impression of presenting illness: Injury to left knee that occurred a couple hours ago.  Pain to left medial knee.  Patient denies any pain unless she is bearing weight.    DDX: includes but is not limited to: Fracture, sprain, strain, dislocation,    Patient arrives comfortable, vital signs stable with vitals interpreted by myself.     Pertinent results: X-ray shows small joint effusion, unclear exact.    Diagnostic information from other sources: Chart review    Interventions / Re-evaluation: Norco and Toradol.  After this able to get patient up and bear weight and ambulate.  Patient reports pain but she is able to do this.    Medications   ketorolac (TORADOL) injection 15 mg (15 mg Intramuscular Given 6/14/25 1649)   HYDROcodone-acetaminophen (NORCO) 5-325 MG per tablet 1 tablet (1 tablet Oral Given 6/14/25 1648)       Results/clinical rationale were discussed with patient and family in the room    Consultations/Discussion of results with other physicians: attending     Data interpreted: Nursing notes reviewed, vital signs reviewed.  Labs independently interpreted by me     Counseling: Discussed the results above with the patient regarding need for admission or discharge.  Patient understands and agrees plan of care.  Discussed with patients the results from today's visit. Discussed with patient strict return precautions and they verbalize understanding. Recommend to them following up with primary care as soon as possible. Patient is discharged hemodynamically stable and comfortable.   Offered patient admission to the hospital for possible rehab if she is unable to walk and bear weight safely in the home and discussed the concern of risk of falls.  Patient and family in the room expressed understanding of my concern for risk of falls, patient  states she does not want to be admitted to the hospital for possible rehab and wants to follow-up with orthopedics knowing the risks.    -----  ED Disposition       ED Disposition   Discharge    Condition   Stable    Comment   --             Final diagnoses:   Acute pain of left knee      Your Follow-Up Providers       Houston Diamond MD.    Specialty: Family Medicine  [address]  [place] [id number] [phone]               Refugio Bustamante MD.    Specialty: Orthopedic Surgery  [address] [place] [address]  [address]  [place] [id number] [phone]                       Contact information for after-discharge care    Follow-up information has not been specified.                    Your medication list        START taking these medications        Instructions Last Dose Given Next Dose Due   HYDROcodone-acetaminophen 5-325 MG per tablet  Commonly known as: NORCO      Take 1 tablet by mouth 4 (Four) Times a Day As Needed for Moderate Pain for up to 14 days.              CONTINUE taking these medications        Instructions Last Dose Given Next Dose Due   amLODIPine 5 MG tablet  Commonly known as: NORVASC      amlodipine 5 mg tablet   TAKE 1 TABLET EVERY DAY       benazepril 40 MG tablet  Commonly known as: LOTENSIN      Take 40 mg by mouth Daily.       cyclobenzaprine 5 MG tablet  Commonly known as: FLEXERIL      Take 1 tablet by mouth 3 (Three) Times a Day As Needed for Muscle Spasms.       diclofenac 50 MG EC tablet  Commonly known as: VOLTAREN      Take 1 tablet by mouth 2 (Two) Times a Day As Needed (pain).       Diclofenac Sodium 1 % gel gel  Commonly known as: VOLTAREN      Apply 4 g topically to the appropriate area as directed 3 (Three) Times a Day.       ezetimibe 10 MG tablet  Commonly known as: ZETIA      ezetimibe 10 mg tablet   1 qd       levothyroxine 75 MCG tablet  Commonly known as: SYNTHROID, LEVOTHROID      levothyroxine 75 mcg tablet   1 Daily       lidocaine 5 %  Commonly known as:  LIDODERM      Place 1 patch on the skin as directed by provider Daily. Remove & Discard patch within 12 hours or as directed by MD       metFORMIN 500 MG tablet  Commonly known as: GLUCOPHAGE      metformin 500 mg tablet   TAKE 1 TABLET TWICE DAILY       pantoprazole 40 MG EC tablet  Commonly known as: PROTONIX      Take 1 tablet by mouth Daily.       pravastatin 40 MG tablet  Commonly known as: PRAVACHOL      pravastatin 40 mg tablet   Take 1 tablet every day by oral route.       sertraline 100 MG tablet  Commonly known as: ZOLOFT      Take 1 tablet by mouth Daily.                 Where to Get Your Medications        These medications were sent to [facility] [id number] - [place], [place] - [facility] [address] AT [facility] [address] - [phone] PH - [phone] [place] [address] [address], [facility] [address]      Phone: [phone]   HYDROcodone-acetaminophen 5-325 MG per tablet

## 2025-06-15 ENCOUNTER — NURSE TRIAGE (OUTPATIENT)
Dept: CALL CENTER | Facility: HOSPITAL | Age: 75
End: 2025-06-15
Payer: MEDICARE

## 2025-06-15 ENCOUNTER — HOSPITAL ENCOUNTER (OUTPATIENT)
Facility: HOSPITAL | Age: 75
Setting detail: OBSERVATION
Discharge: NURSING, REHAB OR LONG-TERM CARE | End: 2025-06-18
Attending: EMERGENCY MEDICINE | Admitting: STUDENT IN AN ORGANIZED HEALTH CARE EDUCATION/TRAINING PROGRAM
Payer: MEDICARE

## 2025-06-15 DIAGNOSIS — R26.2 UNABLE TO AMBULATE: ICD-10-CM

## 2025-06-15 DIAGNOSIS — S86.912A KNEE STRAIN, LEFT, INITIAL ENCOUNTER: Primary | ICD-10-CM

## 2025-06-15 DIAGNOSIS — I10 HYPERTENSION, UNSPECIFIED TYPE: ICD-10-CM

## 2025-06-15 PROBLEM — M79.606 LEG PAIN: Status: ACTIVE | Noted: 2025-06-15

## 2025-06-15 LAB
ALBUMIN SERPL-MCNC: 4.2 G/DL (ref 3.5–5.2)
ALBUMIN/GLOB SERPL: 1.8 G/DL
ALP SERPL-CCNC: 95 U/L (ref 39–117)
ALT SERPL W P-5'-P-CCNC: 12 U/L (ref 1–33)
ANION GAP SERPL CALCULATED.3IONS-SCNC: 12 MMOL/L (ref 5–15)
AST SERPL-CCNC: 23 U/L (ref 1–32)
BASOPHILS # BLD AUTO: 0.04 10*3/MM3 (ref 0–0.2)
BASOPHILS NFR BLD AUTO: 0.7 % (ref 0–1.5)
BILIRUB SERPL-MCNC: 0.5 MG/DL (ref 0–1.2)
BUN SERPL-MCNC: 27 MG/DL (ref 8–23)
BUN/CREAT SERPL: 20.5 (ref 7–25)
CALCIUM SPEC-SCNC: 9.2 MG/DL (ref 8.6–10.5)
CHLORIDE SERPL-SCNC: 109 MMOL/L (ref 98–107)
CO2 SERPL-SCNC: 20 MMOL/L (ref 22–29)
CREAT SERPL-MCNC: 1.32 MG/DL (ref 0.57–1)
DEPRECATED RDW RBC AUTO: 46 FL (ref 37–54)
EGFRCR SERPLBLD CKD-EPI 2021: 41.9 ML/MIN/1.73
EOSINOPHIL # BLD AUTO: 0.22 10*3/MM3 (ref 0–0.4)
EOSINOPHIL NFR BLD AUTO: 3.6 % (ref 0.3–6.2)
ERYTHROCYTE [DISTWIDTH] IN BLOOD BY AUTOMATED COUNT: 13.6 % (ref 12.3–15.4)
GLOBULIN UR ELPH-MCNC: 2.4 GM/DL
GLUCOSE SERPL-MCNC: 107 MG/DL (ref 65–99)
HCT VFR BLD AUTO: 37.4 % (ref 34–46.6)
HGB BLD-MCNC: 12.4 G/DL (ref 12–15.9)
IMM GRANULOCYTES # BLD AUTO: 0.01 10*3/MM3 (ref 0–0.05)
IMM GRANULOCYTES NFR BLD AUTO: 0.2 % (ref 0–0.5)
LYMPHOCYTES # BLD AUTO: 2.61 10*3/MM3 (ref 0.7–3.1)
LYMPHOCYTES NFR BLD AUTO: 42.7 % (ref 19.6–45.3)
MCH RBC QN AUTO: 31.1 PG (ref 26.6–33)
MCHC RBC AUTO-ENTMCNC: 33.2 G/DL (ref 31.5–35.7)
MCV RBC AUTO: 93.7 FL (ref 79–97)
MONOCYTES # BLD AUTO: 0.39 10*3/MM3 (ref 0.1–0.9)
MONOCYTES NFR BLD AUTO: 6.4 % (ref 5–12)
NEUTROPHILS NFR BLD AUTO: 2.84 10*3/MM3 (ref 1.7–7)
NEUTROPHILS NFR BLD AUTO: 46.4 % (ref 42.7–76)
NRBC BLD AUTO-RTO: 0 /100 WBC (ref 0–0.2)
PLATELET # BLD AUTO: 219 10*3/MM3 (ref 140–450)
PMV BLD AUTO: 9.4 FL (ref 6–12)
POTASSIUM SERPL-SCNC: 4.9 MMOL/L (ref 3.5–5.2)
PROT SERPL-MCNC: 6.6 G/DL (ref 6–8.5)
RBC # BLD AUTO: 3.99 10*6/MM3 (ref 3.77–5.28)
SODIUM SERPL-SCNC: 141 MMOL/L (ref 136–145)
WBC NRBC COR # BLD AUTO: 6.11 10*3/MM3 (ref 3.4–10.8)

## 2025-06-15 PROCEDURE — G0378 HOSPITAL OBSERVATION PER HR: HCPCS

## 2025-06-15 PROCEDURE — 80053 COMPREHEN METABOLIC PANEL: CPT | Performed by: NURSE PRACTITIONER

## 2025-06-15 PROCEDURE — 85025 COMPLETE CBC W/AUTO DIFF WBC: CPT | Performed by: NURSE PRACTITIONER

## 2025-06-15 PROCEDURE — 99285 EMERGENCY DEPT VISIT HI MDM: CPT

## 2025-06-15 PROCEDURE — 25810000003 SODIUM CHLORIDE 0.9 % SOLUTION: Performed by: NURSE PRACTITIONER

## 2025-06-15 PROCEDURE — 99222 1ST HOSP IP/OBS MODERATE 55: CPT | Performed by: NURSE PRACTITIONER

## 2025-06-15 PROCEDURE — 96372 THER/PROPH/DIAG INJ SC/IM: CPT

## 2025-06-15 PROCEDURE — 25010000002 HEPARIN (PORCINE) PER 1000 UNITS: Performed by: NURSE PRACTITIONER

## 2025-06-15 RX ORDER — POLYETHYLENE GLYCOL 3350 17 G/17G
17 POWDER, FOR SOLUTION ORAL DAILY PRN
Status: DISCONTINUED | OUTPATIENT
Start: 2025-06-15 | End: 2025-06-18

## 2025-06-15 RX ORDER — BUPROPION HYDROCHLORIDE 150 MG/1
150 TABLET ORAL DAILY
Status: DISCONTINUED | OUTPATIENT
Start: 2025-06-16 | End: 2025-06-18 | Stop reason: HOSPADM

## 2025-06-15 RX ORDER — ONDANSETRON 2 MG/ML
4 INJECTION INTRAMUSCULAR; INTRAVENOUS EVERY 6 HOURS PRN
Status: DISCONTINUED | OUTPATIENT
Start: 2025-06-15 | End: 2025-06-18 | Stop reason: HOSPADM

## 2025-06-15 RX ORDER — ROSUVASTATIN CALCIUM 20 MG/1
40 TABLET, COATED ORAL DAILY
Status: DISCONTINUED | OUTPATIENT
Start: 2025-06-16 | End: 2025-06-18 | Stop reason: HOSPADM

## 2025-06-15 RX ORDER — SODIUM CHLORIDE 0.9 % (FLUSH) 0.9 %
10 SYRINGE (ML) INJECTION EVERY 12 HOURS SCHEDULED
Status: DISCONTINUED | OUTPATIENT
Start: 2025-06-15 | End: 2025-06-18 | Stop reason: HOSPADM

## 2025-06-15 RX ORDER — BISACODYL 10 MG
10 SUPPOSITORY, RECTAL RECTAL DAILY PRN
Status: DISCONTINUED | OUTPATIENT
Start: 2025-06-15 | End: 2025-06-18

## 2025-06-15 RX ORDER — ACETAMINOPHEN 160 MG/5ML
650 SOLUTION ORAL EVERY 4 HOURS PRN
Status: DISCONTINUED | OUTPATIENT
Start: 2025-06-15 | End: 2025-06-18

## 2025-06-15 RX ORDER — ROSUVASTATIN CALCIUM 40 MG/1
40 TABLET, COATED ORAL DAILY
COMMUNITY

## 2025-06-15 RX ORDER — HYDROCODONE BITARTRATE AND ACETAMINOPHEN 5; 325 MG/1; MG/1
1 TABLET ORAL EVERY 6 HOURS PRN
Refills: 0 | Status: DISCONTINUED | OUTPATIENT
Start: 2025-06-15 | End: 2025-06-16

## 2025-06-15 RX ORDER — ACETAMINOPHEN 650 MG/1
650 SUPPOSITORY RECTAL EVERY 4 HOURS PRN
Status: DISCONTINUED | OUTPATIENT
Start: 2025-06-15 | End: 2025-06-18

## 2025-06-15 RX ORDER — ONDANSETRON 4 MG/1
4 TABLET, ORALLY DISINTEGRATING ORAL EVERY 6 HOURS PRN
Status: DISCONTINUED | OUTPATIENT
Start: 2025-06-15 | End: 2025-06-18 | Stop reason: HOSPADM

## 2025-06-15 RX ORDER — BISACODYL 5 MG/1
5 TABLET, DELAYED RELEASE ORAL DAILY PRN
Status: DISCONTINUED | OUTPATIENT
Start: 2025-06-15 | End: 2025-06-18

## 2025-06-15 RX ORDER — SODIUM CHLORIDE 0.9 % (FLUSH) 0.9 %
10 SYRINGE (ML) INJECTION AS NEEDED
Status: DISCONTINUED | OUTPATIENT
Start: 2025-06-15 | End: 2025-06-16

## 2025-06-15 RX ORDER — LISINOPRIL 40 MG/1
40 TABLET ORAL
Status: DISCONTINUED | OUTPATIENT
Start: 2025-06-16 | End: 2025-06-18 | Stop reason: HOSPADM

## 2025-06-15 RX ORDER — SODIUM CHLORIDE 9 MG/ML
75 INJECTION, SOLUTION INTRAVENOUS CONTINUOUS
Status: ACTIVE | OUTPATIENT
Start: 2025-06-15 | End: 2025-06-16

## 2025-06-15 RX ORDER — FLUOCINOLONE ACETONIDE 0.11 MG/ML
4 OIL AURICULAR (OTIC) DAILY
COMMUNITY
End: 2025-06-18 | Stop reason: HOSPADM

## 2025-06-15 RX ORDER — HEPARIN SODIUM 5000 [USP'U]/ML
5000 INJECTION, SOLUTION INTRAVENOUS; SUBCUTANEOUS EVERY 8 HOURS SCHEDULED
Status: DISCONTINUED | OUTPATIENT
Start: 2025-06-15 | End: 2025-06-18 | Stop reason: HOSPADM

## 2025-06-15 RX ORDER — ACETAMINOPHEN 325 MG/1
650 TABLET ORAL EVERY 4 HOURS PRN
Status: DISCONTINUED | OUTPATIENT
Start: 2025-06-15 | End: 2025-06-18 | Stop reason: HOSPADM

## 2025-06-15 RX ORDER — SODIUM CHLORIDE 0.9 % (FLUSH) 0.9 %
10 SYRINGE (ML) INJECTION AS NEEDED
Status: DISCONTINUED | OUTPATIENT
Start: 2025-06-15 | End: 2025-06-18 | Stop reason: HOSPADM

## 2025-06-15 RX ORDER — AMOXICILLIN 250 MG
2 CAPSULE ORAL 2 TIMES DAILY PRN
Status: DISCONTINUED | OUTPATIENT
Start: 2025-06-15 | End: 2025-06-18

## 2025-06-15 RX ORDER — BUPROPION HYDROCHLORIDE 150 MG/1
150 TABLET ORAL DAILY
COMMUNITY

## 2025-06-15 RX ORDER — SODIUM CHLORIDE 9 MG/ML
40 INJECTION, SOLUTION INTRAVENOUS AS NEEDED
Status: DISCONTINUED | OUTPATIENT
Start: 2025-06-15 | End: 2025-06-18 | Stop reason: HOSPADM

## 2025-06-15 RX ORDER — LEVOTHYROXINE SODIUM 25 UG/1
25 TABLET ORAL EVERY MORNING
Status: DISCONTINUED | OUTPATIENT
Start: 2025-06-16 | End: 2025-06-18 | Stop reason: HOSPADM

## 2025-06-15 RX ADMIN — SODIUM CHLORIDE 75 ML/HR: 9 INJECTION, SOLUTION INTRAVENOUS at 22:11

## 2025-06-15 RX ADMIN — HYDROCODONE BITARTRATE AND ACETAMINOPHEN 1 TABLET: 5; 325 TABLET ORAL at 22:34

## 2025-06-15 RX ADMIN — HEPARIN SODIUM 5000 UNITS: 5000 INJECTION INTRAVENOUS; SUBCUTANEOUS at 22:11

## 2025-06-15 RX ADMIN — Medication 10 ML: at 22:11

## 2025-06-15 NOTE — ED PROVIDER NOTES
EMERGENCY DEPARTMENT ENCOUNTER    Pt Name: Katerin Pineda  MRN: [id number]  Pt :   1950  Room Number:    Date of encounter:  6/15/2025  PCP: Houston Diamond MD  ED Provider: ERIK Cai    Historian: Patient    HPI:  Chief Complaint:  Lt knee pain    Context: Katerin Pineda is a 74 y.o. female who presents to the ED c/o Lt knee pain.  Pt explains that yesterday she was getting into her truck.  Patient fell causing her to go down on her left knee.  She advises that she was seen initially at the [place] emergency department.  Patient was negative for fracture however was told that she could possibly have a tear. Pt is currently wearing a knee immobilizer. Pt explains that she leaves alone. Pt is unable to care for herself.  Pt is here for admission.   HPI     REVIEW OF SYSTEMS  A chief complaint appropriate review of systems was completed and is negative except as noted in the HPI.     PAST MEDICAL HISTORY  Past Medical History:   Diagnosis Date    Anxiety     Disease of thyroid gland     Hyperlipidemia     Hypertension     Sleep apnea        PAST SURGICAL HISTORY  Past Surgical History:   Procedure Laterality Date    KNEE ARTHROPLASTY, PARTIAL REPLACEMENT Left     OTHER SURGICAL HISTORY      blsdder control implant    UVULOPALATOPHARYNGOPLASTY      WRIST SURGERY Right        FAMILY HISTORY  Family History   Problem Relation Age of Onset    No Known Problems Mother     Heart attack Father 45    Coronary artery disease Sister 60    Cancer Brother     No Known Problems Maternal Grandmother     No Known Problems Maternal Grandfather     No Known Problems Paternal Grandmother     No Known Problems Paternal Grandfather     David Parkinson White syndrome Other        SOCIAL HISTORY  Social History     Socioeconomic History    Marital status:    Tobacco Use    Smoking status: Never    Smokeless tobacco: Never   Substance and Sexual Activity    Alcohol use: Not Currently    Drug use:  Never    Sexual activity: Defer       ALLERGIES  Patient has no known allergies.    PHYSICAL EXAM  Physical Exam  Vitals and nursing note reviewed.   Constitutional:       General: She is in acute distress.      Appearance: Normal appearance. She is not ill-appearing.   HENT:      Head: Normocephalic and atraumatic.      Nose: Nose normal.      Mouth/Throat:      Mouth: Mucous membranes are moist.   Eyes:      Extraocular Movements: Extraocular movements intact.      Conjunctiva/sclera: Conjunctivae normal.   Cardiovascular:      Rate and Rhythm: Normal rate and regular rhythm.      Pulses: Normal pulses.      Heart sounds: Normal heart sounds.   Pulmonary:      Effort: Pulmonary effort is normal.      Breath sounds: Normal breath sounds.   Musculoskeletal:      Cervical back: Normal range of motion and neck supple.      Left knee: Swelling (anterior) present. Decreased range of motion. Tenderness present.   Skin:     General: Skin is warm and dry.   Neurological:      General: No focal deficit present.      Mental Status: She is alert and oriented to person, place, and time.   Psychiatric:         Mood and Affect: Mood normal.         Behavior: Behavior normal.           LAB RESULTS  Results for orders placed or performed during the hospital encounter of 06/15/25   Comprehensive Metabolic Panel    Collection Time: 06/15/25  5:03 PM    Specimen: Blood   Result Value Ref Range    Glucose 107 (H) 65 - 99 mg/dL    BUN 27.0 (H) 8.0 - 23.0 mg/dL    Creatinine 1.32 (H) 0.57 - 1.00 mg/dL    Sodium 141 136 - 145 mmol/L    Potassium 4.9 3.5 - 5.2 mmol/L    Chloride 109 (H) 98 - 107 mmol/L    CO2 20.0 (L) 22.0 - 29.0 mmol/L    Calcium 9.2 8.6 - 10.5 mg/dL    Total Protein 6.6 6.0 - 8.5 g/dL    Albumin 4.2 3.5 - 5.2 g/dL    ALT (SGPT) 12 1 - 33 U/L    AST (SGOT) 23 1 - 32 U/L    Alkaline Phosphatase 95 39 - 117 U/L    Total Bilirubin 0.5 0.0 - 1.2 mg/dL    Globulin 2.4 gm/dL    A/G Ratio 1.8 g/dL    BUN/Creatinine Ratio 20.5  7.0 - 25.0    Anion Gap 12.0 5.0 - 15.0 mmol/L    eGFR 41.9 (L) >60.0 mL/min/1.73   CBC Auto Differential    Collection Time: 06/15/25  5:03 PM    Specimen: Blood   Result Value Ref Range    WBC 6.11 3.40 - 10.80 10*3/mm3    RBC 3.99 3.77 - 5.28 10*6/mm3    Hemoglobin 12.4 12.0 - 15.9 g/dL    Hematocrit 37.4 34.0 - 46.6 %    MCV 93.7 79.0 - 97.0 fL    MCH 31.1 26.6 - 33.0 pg    MCHC 33.2 31.5 - 35.7 g/dL    RDW 13.6 12.3 - 15.4 %    RDW-SD 46.0 37.0 - 54.0 fl    MPV 9.4 6.0 - 12.0 fL    Platelets 219 140 - 450 10*3/mm3    Neutrophil % 46.4 42.7 - 76.0 %    Lymphocyte % 42.7 19.6 - 45.3 %    Monocyte % 6.4 5.0 - 12.0 %    Eosinophil % 3.6 0.3 - 6.2 %    Basophil % 0.7 0.0 - 1.5 %    Immature Grans % 0.2 0.0 - 0.5 %    Neutrophils, Absolute 2.84 1.70 - 7.00 10*3/mm3    Lymphocytes, Absolute 2.61 0.70 - 3.10 10*3/mm3    Monocytes, Absolute 0.39 0.10 - 0.90 10*3/mm3    Eosinophils, Absolute 0.22 0.00 - 0.40 10*3/mm3    Basophils, Absolute 0.04 0.00 - 0.20 10*3/mm3    Immature Grans, Absolute 0.01 0.00 - 0.05 10*3/mm3    nRBC 0.0 0.0 - 0.2 /100 WBC       If labs were ordered, I independently reviewed the results and considered them in treating the patient.    RADIOLOGY  No orders to display     [] Radiologist's Report Reviewed:  I ordered and independently interpreted the above noted radiographic studies.  See radiologist's dictation for official interpretation.      PROCEDURES    Procedures    No orders to display       MEDICATIONS GIVEN IN ER    Medications   sodium chloride 0.9 % flush 10 mL (has no administration in time range)   HYDROcodone-acetaminophen (NORCO) 5-325 MG per tablet 1 tablet (has no administration in time range)       MEDICAL DECISION MAKING, PROGRESS, and CONSULTS   Medical Decision Making  Katerin Pineda is a 74 y.o. female who presents to the ED c/o Lt knee pain.  Pt explains that yesterday she was getting into her truck.  Patient fell causing her to go down on her left knee.  She advises that she  was seen initially at the [place] emergency department.  Patient was negative for fracture however was told that she could possibly have a tear. Pt is currently wearing a knee immobilizer. Pt explains that she leaves alone. Pt is unable to care for herself.  Pt is here for admission.       Problems Addressed:  Hypertension, unspecified type: chronic illness or injury  Knee strain, left, initial encounter: complicated acute illness or injury     Details: Patient fell getting into a truck 1 day ago.  Patient is unable to bear weight on her left lower extremity.  Unable to ambulate: complicated acute illness or injury     Details: Patient is unable to stay home safely alone.  She is unable to ambulate without assistance.    Amount and/or Complexity of Data Reviewed  Labs: ordered. Decision-making details documented in ED Course.    Risk  Prescription drug management.  Decision regarding hospitalization.        Discussion below represents my analysis of pertinent findings related to patient's condition, differential diagnosis, treatment plan and final disposition.    Assessment includes with Differential diagnosis including but is not limited to: knee sprain, knee strain, unable to ambulate,     Additional sources  Discussed/ obtained information from independent historians:   [] Spouse  [] Parent  [x] Family member  [] Friend  [] EMS   [] Other:  External (non-ED) record review:   [] Inpatient record:   [] Office record:   [] Outpatient record:   [x] Prior Outpatient labs:   [x] Prior Outpatient radiology:   [] Primary Care record:   [] Outside ED record:   [] Other:   Patient's care impacted by:   [] Diabetes  [x] Hypertension  [x] Hyperlipidemia  [] Hypothyroidism   [] Coronary Artery Disease  [] Congestive Heart Failure   [] COPD   [] Cancer   [] Obesity  [] GERD   [] Tobacco Abuse   [] Substance Abuse    [x] Anxiety   [] Depression   [] Other:   Care significantly affected by Social Determinants of Health (housing  and economic circumstances, unemployment)    [] Yes     [x] No   If yes, Patient's care significantly limited by  Social Determinants of Health including:   [] Inadequate housing   [] Low income   [] Alcoholism and drug addiction in family   [] Problems related to primary support group   [] Unemployment   [] Problems related to employment   [] Other Social Determinants of Health:     Orders placed during this visit:  Orders Placed This Encounter   Procedures    Comprehensive Metabolic Panel    CBC Auto Differential    Diet: Regular/House; Texture: Regular (IDDSI 7); Fluid Consistency: Thin (IDDSI 0)    Code Status and Medical Interventions: CPR (Attempt to Resuscitate); Full Support    Inpatient Orthopedic Surgery Consult    Insert Peripheral IV    Initiate Observation Status    CBC & Differential       I considered prescription management  with:   [] Pain medication  [] Antiviral  [] Antibiotic   [] Other:   Rationale:  Additional orders considered but not ordered:  The following testing was considered but ultimately not selected after discussion with patient/family:  ED Course:    ED Course as of 06/15/25 1902   Sun Jose Angel 15, 2025   4300 Spoke with Dr. Paulino regarding patient presentation, recent injury and need for admission.  No additional imaging is needed at this time.  He suggest admitting to the hospitalist and he will see patient in the a.m.  He will determine if any additional imaging is needed at that time. [KG]      ED Course User Index  [KG] Nataly Giles, ERIK            DIAGNOSIS  Final diagnoses:   Knee strain, left, initial encounter   Unable to ambulate   Hypertension, unspecified type       DISPOSITION    ED Disposition       ED Disposition   Decision to Admit    Condition   --    Comment   Level of Care: Telemetry [5]   Diagnosis: Leg pain [199497]   Admitting Physician: OZ WATKINS [[id number]]   Attending Physician: OZ WATKINS [[id number]]   Is patient appropriate for Inpatient  Observation Unit?: No [0]                     Nataly Giles, APRN  06/15/25 1904

## 2025-06-15 NOTE — H&P
[facility] Medicine Services  HISTORY AND PHYSICAL    Patient Name: Katerin Pineda  : 1950  MRN: [id number]  Primary Care Physician: Houston Diamond MD  Date of admission: 6/15/2025    Subjective   Subjective     Chief Complaint:  Left knee pain     HPI:  Katerin Pineda is a 74 y.o. female with PMH of HTN, HLD, hypothyroidism, UCHE presented to ED with complaints of left knee pain and inability to bear weight. States she was trying to get up into a tall truck, leg slipped, and she landed on her left knee on the floor board of the truck. Had immediate pain. Unable to bear weight without severe pain. Was seen in ED last night with imaging negative for any fractures or hardware damage. Discharged home with pain medication. States family had to return today to help get her mobile in her home, unable to bear weight due to pain. Returned to ED for evaluation. Labs with elevated creatinine of 1.3. States she was given an IV anti-inflammatory yesterday. Will admit with ortho/ PT/OT consultation.         Review of Systems   Constitutional:  Positive for activity change. Negative for chills, fatigue and fever.   HENT:  Negative for sore throat, trouble swallowing and voice change.    Eyes:  Negative for photophobia and visual disturbance.   Respiratory:  Negative for cough, shortness of breath and wheezing.    Cardiovascular:  Positive for leg swelling. Negative for chest pain and palpitations.   Gastrointestinal:  Negative for abdominal distention, abdominal pain, nausea and vomiting.   Endocrine: Negative for cold intolerance and heat intolerance.   Genitourinary:  Negative for difficulty urinating, dysuria and flank pain.   Musculoskeletal:  Positive for gait problem and joint swelling. Negative for arthralgias and back pain.   Skin:  Negative for color change, pallor and rash.   Neurological:  Negative for dizziness, syncope, facial asymmetry, speech difficulty, weakness,  light-headedness and headaches.   Hematological:  Negative for adenopathy. Does not bruise/bleed easily.   Psychiatric/Behavioral:  Negative for agitation, behavioral problems and confusion.           Personal History     Past Medical History:   Diagnosis Date    Anxiety     Disease of thyroid gland     Hyperlipidemia     Hypertension     Sleep apnea              Past Surgical History:   Procedure Laterality Date    KNEE ARTHROPLASTY, PARTIAL REPLACEMENT Left     OTHER SURGICAL HISTORY      blsdder control implant    UVULOPALATOPHARYNGOPLASTY      WRIST SURGERY Right        Family History:  family history includes Cancer in her brother; Coronary artery disease (age of onset: 60) in her sister; Heart attack (age of onset: 45) in her father; No Known Problems in her maternal grandfather, maternal grandmother, mother, paternal grandfather, and paternal grandmother; David Parkinson White syndrome in an other family member.     Social History:  reports that she has never smoked. She has never used smokeless tobacco. She reports that she does not currently use alcohol. She reports that she does not use drugs.  Social History     Social History Narrative    Caffeine: Seldom 2 glasses in week       Medications:  HYDROcodone-acetaminophen, benazepril, buPROPion XL, fluocinolone acetonide, levothyroxine, and rosuvastatin    No Known Allergies    Objective   Objective     Vital Signs:   Temp:  [98.3 °F (36.8 °C)] 98.3 °F (36.8 °C)  Heart Rate:  [65-82] 67  Resp:  [14] 14  BP: (118-143)/(72-80) 142/79    Physical Exam   Constitutional: Awake, alert  Eyes: PERRLA, sclerae anicteric, no conjunctival injection  HENT: NCAT, mucous membranes moist  Neck: Supple, no thyromegaly, no lymphadenopathy, trachea midline  Respiratory: Clear to auscultation bilaterally, nonlabored respirations   Cardiovascular: RRR, no murmurs, rubs, or gallops, palpable pedal pulses bilaterally  Gastrointestinal: Positive bowel sounds, soft, nontender,  nondistended  Musculoskeletal: No bilateral ankle edema, no clubbing or cyanosis to extremities; left knee in immobility device  Psychiatric: Appropriate affect, cooperative  Neurologic: Oriented x 3, strength symmetric in all extremities, Cranial Nerves grossly intact to confrontation, speech clear  Skin: No rashes     Result Review:  I have personally reviewed the results from the time of this admission to 6/15/2025 19:06 EDT and agree with these findings:  [x]  Laboratory list / accordion  [x]  Microbiology  [x]  Radiology  []  EKG/Telemetry   []  Cardiology/Vascular   []  Pathology  []  Old records  []  Other:  Most notable findings include:     LAB RESULTS:      Lab 06/15/25  1703   WBC 6.11   HEMOGLOBIN 12.4   HEMATOCRIT 37.4   PLATELETS 219   NEUTROS ABS 2.84   IMMATURE GRANS (ABS) 0.01   LYMPHS ABS 2.61   MONOS ABS 0.39   EOS ABS 0.22   MCV 93.7         Lab 06/15/25  1703   SODIUM 141   POTASSIUM 4.9   CHLORIDE 109*   CO2 20.0*   ANION GAP 12.0   BUN 27.0*   CREATININE 1.32*   EGFR 41.9*   GLUCOSE 107*   CALCIUM 9.2         Lab 06/15/25  1703   TOTAL PROTEIN 6.6   ALBUMIN 4.2   GLOBULIN 2.4   ALT (SGPT) 12   AST (SGOT) 23   BILIRUBIN 0.5   ALK PHOS 95                     Brief Urine Lab Results       None          Microbiology Results (last 10 days)       ** No results found for the last 240 hours. **            XR Knee 1 or 2 View Left  Result Date: 6/14/2025  XR KNEE 1 OR 2 VW LEFT Date of Exam: 6/14/2025 3:54 PM EDT Indication: fall, knee buckling Comparison: None available. Findings: Prior medial compartment knee arthroplasty. Mild degenerative changes in the patellofemoral and lateral compartments with osteophyte formation. No evidence of acute fracture or joint dislocation. Small joint effusion.     Impression: Impression: Unicompartmental knee prosthetic without hardware complication or acute osseous injury. Small joint effusion. Mild degenerative changes in the nonoperative compartments.  Electronically Signed: Denzel Swanson MD  6/14/2025 4:14 PM EDT  Workstation ID: [id number]          Assessment & Plan   Assessment & Plan       Leg pain    Hypertensive disorder    Obstructive sleep apnea syndrome    Type 2 diabetes mellitus without complication    Hypothyroidism      Left knee pain   --Xray negative for acute findings  --unable to bear weight  --Ortho consultation in AM   --pain control   --per ED provider who spoke with ortho, no need for additional imaging tonight, nor NPO    Elevated creatinine   --gentle IVF overnight  --hold ACEi  --repeat labs in am   --no Toradol for now     HTN  --holding ACEi  --monitor     Hypothyroidism   --TSH in am   --cont Synthroid     DM?  --patient states not diabetic  --A1c in am     UCHE  --non compliant with CPAP  --monitor       DVT prophylaxis:  Heparin     CODE STATUS:    Code Status (Patient has no pulse and is not breathing): CPR (Attempt to Resuscitate)  Medical Interventions (Patient has pulse or is breathing): Full Support  Level Of Support Discussed With: Patient      Expected Discharge  Expected Discharge Date: 6/19/2025; Expected Discharge Time:       This note has been completed as part of a split-shared workflow.     Signature: Electronically signed by ERIK Combs, 06/15/25, 7:06 PM EDT

## 2025-06-15 NOTE — ED NOTES
Katerin Pineda    Nursing Report ED to Floor:  Mental status: A/Ox4  Ambulatory status: Unable to ambulate at this   Oxygen Therapy:  RA  Cardiac Rhythm: NSR  Admitted from: Home  Safety Concerns:  Fall risk   Precautions: NA  Social Issues: NA  ED Room #:  27    ED Nurse Phone Extension - [phone] or may call [phone].      HPI:   Chief Complaint   Patient presents with    Knee Pain       Past Medical History:  Past Medical History:   Diagnosis Date    Anxiety     Disease of thyroid gland     Hyperlipidemia     Hypertension     Sleep apnea         Past Surgical History:  Past Surgical History:   Procedure Laterality Date    KNEE ARTHROPLASTY, PARTIAL REPLACEMENT Left     OTHER SURGICAL HISTORY      blsdder control implant    UVULOPALATOPHARYNGOPLASTY      WRIST SURGERY Right         Admitting Doctor:   Mariah Wise MD    Consulting Provider(s):  Consults       Date and Time Order Name Status Description    6/15/2025  5:47 PM Inpatient Orthopedic Surgery Consult               Admitting Diagnosis:   There were no encounter diagnoses.    Most Recent Vitals:   Vitals:    06/15/25 1630 06/15/25 1644 06/15/25 1700 06/15/25 1730   BP: 136/79 131/74  121/76   Patient Position:       Pulse: 79 75 75 75   Resp:       Temp:       TempSrc:       SpO2: 96% 95% 97% 97%   Weight:       Height:           Active LDAs/IV Access:   Lines, Drains & Airways       Active LDAs       Name Placement date Placement time Site Days    Peripheral IV 06/15/25 1705 20 G Anterior;Right Forearm 06/15/25  1705  Forearm  less than 1                    Labs (abnormal labs have a star):   Labs Reviewed   COMPREHENSIVE METABOLIC PANEL - Abnormal; Notable for the following components:       Result Value    Glucose 107 (*)     BUN 27.0 (*)     Creatinine 1.32 (*)     Chloride 109 (*)     CO2 20.0 (*)     eGFR 41.9 (*)     All other components within normal limits    Narrative:     GFR Categories in Chronic Kidney Disease (CKD)              GFR Category           GFR (mL/min/1.73)    Interpretation  G1                    90 or greater        Normal or high (1)  G2                    60-89                Mild decrease (1)  G3a                   45-59                Mild to moderate decrease  G3b                   30-44                Moderate to severe decrease  G4                    15-29                Severe decrease  G5                    14 or less           Kidney failure    (1)In the absence of evidence of kidney disease, neither GFR category G1 or G2 fulfill the criteria for CKD.    eGFR calculation 2021 CKD-EPI creatinine equation, which does not include race as a factor   CBC WITH AUTO DIFFERENTIAL - Normal   CBC AND DIFFERENTIAL    Narrative:     The following orders were created for panel order CBC & Differential.  Procedure                               Abnormality         Status                     ---------                               -----------         ------                     CBC Auto Differential[[id number]]        Normal              Final result                 Please view results for these tests on the individual orders.       Meds Given in ED:   Medications   sodium chloride 0.9 % flush 10 mL (has no administration in time range)           Last NIH score:                                                          Dysphagia screening results:  Patient Factors Component (Dysphagia:Stroke or Rule-out)  Best Eye Response: 4-->(E4) spontaneous (06/15/25 1622)  Best Motor Response: 6-->(M6) obeys commands (06/15/25 1622)  Best Verbal Response: 5-->(V5) oriented (06/15/25 1622)  Julianna Coma Scale Score: 15 (06/15/25 1622)     Alborn Coma Scale:  No data recorded     CIWA:        Restraint Type:            Isolation Status:  No active isolations

## 2025-06-15 NOTE — TELEPHONE ENCOUNTER
DCd [facility] ED yesterday. Knee problem. Were unable to do MRI as they told patient they did not have the capability and she has an old stimulator in place and compatibility was uncertain. They advised she be seen in a different  facility for MRI capabilities, if indicated. ER at [facility] also suggested option for inpatient stay and then rehab. Declined and patient home with brace and meds. Today, sister went over to check on her and she could not get off couch to open the door for her. Maintenance let them in but still unable to get her off couch with assist of 2 family members. Suggested to be seen again and re-eval for rehab/ MRI possibility at [facility] ED as patient is clearly not safe to be home (she lives alone) at this time. Verbalized understanding and will have patient seen today. Advised caller to not put themselves in harms way attempting to stand the patient and if assist is needed, call fire dept to ensure safety of transfer and all parties involved. Verbalized understanding.    Reason for Disposition   Condition / symptoms WORSE    Additional Information   Negative: Sounds like a life-threatening emergency to the triager   Negative: Discharged in past month from the hospital with a diagnosis of chronic obstructive pulmonic disease (COPD)   Negative: Discharged in past month from the hospital with a diagnosis of congestive heart failure (CHF) or heart failure (HF)   Negative: Discharged in past month from the hospital with a diagnosis of heart attack (myocardial infarction)   Negative: Discharged in past month from the hospital with a diagnosis of pneumonia   Negative: Taking antibiotic for cellulitis (follow-up call)   Negative: Taking antibiotic for other infection (follow-up call)   Negative: [1] Post-op AND [2] incision symptoms or questions   Negative: [1] Post-op AND [2] general symptoms or questions   Negative: Pain, redness, swelling, or pus at IV Site   Negative: IV not running or running slowly    "Negative: Symptoms arising from use of a urinary catheter (e.g., Coude, Babcock)   Negative: Medication question   Negative: New-onset fever   Negative: [1] New symptom AND [2] not a possible complication of hospitalized condition   Negative: Patient sounds very sick or weak to the triager   Negative: Sounds like a serious complication to the triager   Negative: [1] Caller has URGENT question AND [2] triager unable to answer question   Negative: [1] Discharged from hospital within this past week AND [2] taking Coumadin (warfarin) AND [3] no INR testing performed within 5 days of discharge   Negative: Condition / symptoms SAME (not improving)    Answer Assessment - Initial Assessment Questions  1. MAIN CONCERN OR SYMPTOM:  \"What is your main concern right now?\" \"What question do you have?\" \"What's the main symptom you're worried about?\" (e.g., breathing difficulty, ankle swelling, weight gain.)    DCd [facility] ED yesterday. Knee problem. Were unable to do MRI as they told patient they did not have the capability and she has an old stimulator in place and compatibility was uncertain. They advised she be seen in a different  facility for MRI capabilities, if indicated. ER at [facility] also suggested option for inpatient stay and then rehab. Declined and patient home with brace and meds. Today, sister went over to check on her and she could not get off couch to open the door for her. Maintenance let them in but still unable to get her off couch with assist of 2 family members. Suggested to be seen again and re-eval for rehab/ MRI possibility at [facility] ED as patient is clearly not safe to be home (she lives alone) at this time. Verbalized understanding and will have patient seen today. Advised caller to not put themselves in harms way attempting to stand the patient and if assist is needed, call fire dept to ensure safety of transfer and all parties involved. Verbalized understanding.    Protocols used: Post-Hospitalization " Follow-up Call-ADULT-AH

## 2025-06-16 LAB
ANION GAP SERPL CALCULATED.3IONS-SCNC: 10 MMOL/L (ref 5–15)
BUN SERPL-MCNC: 24.9 MG/DL (ref 8–23)
BUN/CREAT SERPL: 19.9 (ref 7–25)
CALCIUM SPEC-SCNC: 8.9 MG/DL (ref 8.6–10.5)
CHLORIDE SERPL-SCNC: 111 MMOL/L (ref 98–107)
CO2 SERPL-SCNC: 21 MMOL/L (ref 22–29)
CREAT SERPL-MCNC: 1.25 MG/DL (ref 0.57–1)
DEPRECATED RDW RBC AUTO: 50.1 FL (ref 37–54)
EGFRCR SERPLBLD CKD-EPI 2021: 44.8 ML/MIN/1.73
ERYTHROCYTE [DISTWIDTH] IN BLOOD BY AUTOMATED COUNT: 13.7 % (ref 12.3–15.4)
GLUCOSE BLDC GLUCOMTR-MCNC: 121 MG/DL (ref 70–130)
GLUCOSE BLDC GLUCOMTR-MCNC: 153 MG/DL (ref 70–130)
GLUCOSE SERPL-MCNC: 97 MG/DL (ref 65–99)
HBA1C MFR BLD: 6 % (ref 4.8–5.6)
HCT VFR BLD AUTO: 34.9 % (ref 34–46.6)
HGB BLD-MCNC: 11 G/DL (ref 12–15.9)
MCH RBC QN AUTO: 30.9 PG (ref 26.6–33)
MCHC RBC AUTO-ENTMCNC: 31.5 G/DL (ref 31.5–35.7)
MCV RBC AUTO: 98 FL (ref 79–97)
PLATELET # BLD AUTO: 175 10*3/MM3 (ref 140–450)
PMV BLD AUTO: 9.7 FL (ref 6–12)
POTASSIUM SERPL-SCNC: 4.6 MMOL/L (ref 3.5–5.2)
RBC # BLD AUTO: 3.56 10*6/MM3 (ref 3.77–5.28)
SODIUM SERPL-SCNC: 142 MMOL/L (ref 136–145)
TSH SERPL DL<=0.05 MIU/L-ACNC: 5.73 UIU/ML (ref 0.27–4.2)
WBC NRBC COR # BLD AUTO: 5.64 10*3/MM3 (ref 3.4–10.8)

## 2025-06-16 PROCEDURE — 25010000002 HEPARIN (PORCINE) PER 1000 UNITS: Performed by: NURSE PRACTITIONER

## 2025-06-16 PROCEDURE — G0378 HOSPITAL OBSERVATION PER HR: HCPCS

## 2025-06-16 PROCEDURE — 83036 HEMOGLOBIN GLYCOSYLATED A1C: CPT | Performed by: NURSE PRACTITIONER

## 2025-06-16 PROCEDURE — 80048 BASIC METABOLIC PNL TOTAL CA: CPT | Performed by: NURSE PRACTITIONER

## 2025-06-16 PROCEDURE — 99232 SBSQ HOSP IP/OBS MODERATE 35: CPT | Performed by: STUDENT IN AN ORGANIZED HEALTH CARE EDUCATION/TRAINING PROGRAM

## 2025-06-16 PROCEDURE — 97162 PT EVAL MOD COMPLEX 30 MIN: CPT

## 2025-06-16 PROCEDURE — 85027 COMPLETE CBC AUTOMATED: CPT | Performed by: NURSE PRACTITIONER

## 2025-06-16 PROCEDURE — 82948 REAGENT STRIP/BLOOD GLUCOSE: CPT

## 2025-06-16 PROCEDURE — 97165 OT EVAL LOW COMPLEX 30 MIN: CPT

## 2025-06-16 PROCEDURE — 63710000001 INSULIN LISPRO (HUMAN) PER 5 UNITS: Performed by: STUDENT IN AN ORGANIZED HEALTH CARE EDUCATION/TRAINING PROGRAM

## 2025-06-16 PROCEDURE — 84443 ASSAY THYROID STIM HORMONE: CPT | Performed by: NURSE PRACTITIONER

## 2025-06-16 PROCEDURE — 96372 THER/PROPH/DIAG INJ SC/IM: CPT

## 2025-06-16 RX ORDER — DULOXETIN HYDROCHLORIDE 60 MG/1
60 CAPSULE, DELAYED RELEASE ORAL DAILY
COMMUNITY

## 2025-06-16 RX ORDER — NICOTINE POLACRILEX 4 MG
15 LOZENGE BUCCAL
Status: DISCONTINUED | OUTPATIENT
Start: 2025-06-16 | End: 2025-06-18 | Stop reason: HOSPADM

## 2025-06-16 RX ORDER — IBUPROFEN 600 MG/1
1 TABLET ORAL
Status: DISCONTINUED | OUTPATIENT
Start: 2025-06-16 | End: 2025-06-18 | Stop reason: HOSPADM

## 2025-06-16 RX ORDER — INSULIN LISPRO 100 [IU]/ML
2-7 INJECTION, SOLUTION INTRAVENOUS; SUBCUTANEOUS
Status: DISCONTINUED | OUTPATIENT
Start: 2025-06-16 | End: 2025-06-18 | Stop reason: HOSPADM

## 2025-06-16 RX ORDER — TRAMADOL HYDROCHLORIDE 50 MG/1
50 TABLET ORAL EVERY 6 HOURS PRN
Status: DISCONTINUED | OUTPATIENT
Start: 2025-06-16 | End: 2025-06-18 | Stop reason: HOSPADM

## 2025-06-16 RX ORDER — DEXTROSE MONOHYDRATE 25 G/50ML
25 INJECTION, SOLUTION INTRAVENOUS
Status: DISCONTINUED | OUTPATIENT
Start: 2025-06-16 | End: 2025-06-18 | Stop reason: HOSPADM

## 2025-06-16 RX ADMIN — INSULIN LISPRO 2 UNITS: 100 INJECTION, SOLUTION INTRAVENOUS; SUBCUTANEOUS at 21:30

## 2025-06-16 RX ADMIN — Medication 10 ML: at 08:00

## 2025-06-16 RX ADMIN — HYDROCODONE BITARTRATE AND ACETAMINOPHEN 1 TABLET: 5; 325 TABLET ORAL at 07:52

## 2025-06-16 RX ADMIN — BUPROPION HYDROCHLORIDE 150 MG: 150 TABLET, EXTENDED RELEASE ORAL at 08:00

## 2025-06-16 RX ADMIN — TRAMADOL HYDROCHLORIDE 50 MG: 50 TABLET, COATED ORAL at 21:19

## 2025-06-16 RX ADMIN — LEVOTHYROXINE SODIUM 25 MCG: 0.03 TABLET ORAL at 06:00

## 2025-06-16 RX ADMIN — HEPARIN SODIUM 5000 UNITS: 5000 INJECTION INTRAVENOUS; SUBCUTANEOUS at 13:10

## 2025-06-16 RX ADMIN — TRAMADOL HYDROCHLORIDE 50 MG: 50 TABLET, COATED ORAL at 13:09

## 2025-06-16 RX ADMIN — Medication 10 ML: at 21:32

## 2025-06-16 RX ADMIN — HEPARIN SODIUM 5000 UNITS: 5000 INJECTION INTRAVENOUS; SUBCUTANEOUS at 06:01

## 2025-06-16 RX ADMIN — HEPARIN SODIUM 5000 UNITS: 5000 INJECTION INTRAVENOUS; SUBCUTANEOUS at 21:29

## 2025-06-16 RX ADMIN — ROSUVASTATIN CALCIUM 40 MG: 20 TABLET, FILM COATED ORAL at 08:00

## 2025-06-16 NOTE — CONSULTS
Orthopedic Consult      Patient: Katerin Pineda    Date of Admission: 6/15/2025  4:14 PM    YOB: 1950    Medical Record Number: [id number]    Attending Physician: Valentine Kelsey MD    Consulting Physician: Danilo Paulino MD      Chief Complaints: Leg pain [M79.606]      History of Present Illness: 74 y.o. female admitted to [facility] with Leg pain [M79.606].  PMH of HTN, HLD, hypothyroidism, UCHE presented to ED with complaints of left knee pain and inability to bear weight. States she was trying to get up into a tall truck, leg slipped, and she landed on her left knee on the floor board of the truck. Had immediate pain.  Was seen in ED 2 nights ago with imaging negative for any fractures or hardware damage. Discharged home with pain medication. States family had to return today to help get her mobile in her home, unable to bear weight due to pain. Returned to ED for evaluation.  Patient was unable to be discharged back home due to her lack of mobility and pain and was admitted for likely need for admission to a rehab facility.  She tells me today that the right shoulder is also little bit irritated for her.  She has a known history of shoulder pain that intermittently flares up and this feels typical to her.  This did not hurt until this morning when she woke up.  She does not feel further workup of this is needed at this point.     No Known Allergies     Home Medications:  Medications Prior to Admission   Medication Sig Dispense Refill Last Dose/Taking    benazepril (LOTENSIN) 40 MG tablet Take 1 tablet by mouth Daily.   6/14/2025    buPROPion XL (WELLBUTRIN XL) 150 MG 24 hr tablet Take 1 tablet by mouth Daily.   6/14/2025    fluocinolone acetonide (DERMOTIC) 0.01 % oil otic oil 4 drops Daily. AS directed.   6/14/2025    HYDROcodone-acetaminophen (NORCO) 5-325 MG per tablet Take 1 tablet by mouth 4 (Four) Times a Day As Needed for Moderate Pain for up to 14 days. 14 tablet 0 Past Week     levothyroxine (SYNTHROID, LEVOTHROID) 25 MCG tablet Take 1 tablet by mouth Every Morning.   6/16/2025 Morning    rosuvastatin (CRESTOR) 40 MG tablet Take 1 tablet by mouth Daily.   6/16/2025 Morning    DULoxetine (CYMBALTA) 60 MG capsule Take 1 capsule by mouth Daily.            Past Medical History:   Diagnosis Date    Anxiety     Disease of thyroid gland     Hyperlipidemia     Hypertension     Sleep apnea         Past Surgical History:   Procedure Laterality Date    KNEE ARTHROPLASTY, PARTIAL REPLACEMENT Left     OTHER SURGICAL HISTORY      blsdder control implant    UVULOPALATOPHARYNGOPLASTY      WRIST SURGERY Right         Social History     Occupational History    Not on file   Tobacco Use    Smoking status: Never    Smokeless tobacco: Never   Vaping Use    Vaping status: Never Used   Substance and Sexual Activity    Alcohol use: Not Currently    Drug use: Never    Sexual activity: Defer      Social History     Social History Narrative    Caffeine: Seldom 2 glasses in week        Family History   Problem Relation Age of Onset    No Known Problems Mother     Heart attack Father 45    Coronary artery disease Sister 60    Cancer Brother     No Known Problems Maternal Grandmother     No Known Problems Maternal Grandfather     No Known Problems Paternal Grandmother     No Known Problems Paternal Grandfather     David Parkinson White syndrome Other          Review of Systems:   Constitutional:  Positive for activity change. Negative for chills, fatigue and fever.   HENT:  Negative for sore throat, trouble swallowing and voice change.    Eyes:  Negative for photophobia and visual disturbance.   Respiratory:  Negative for cough, shortness of breath and wheezing.    Cardiovascular:  Positive for leg swelling. Negative for chest pain and palpitations.   Gastrointestinal:  Negative for abdominal distention, abdominal pain, nausea and vomiting.   Endocrine: Negative for cold intolerance and heat intolerance.    Genitourinary:  Negative for difficulty urinating, dysuria and flank pain.   Musculoskeletal:  Positive for gait problem and joint swelling. Negative for arthralgias and back pain.   Skin:  Negative for color change, pallor and rash.   Neurological:  Negative for dizziness, syncope, facial asymmetry, speech difficulty, weakness, light-headedness and headaches.   Hematological:  Negative for adenopathy. Does not bruise/bleed easily.   Psychiatric/Behavioral:  Negative for agitation, behavioral problems and confusion.      Physical Exam: 74 y.o. female  General Appearance:    Alert, cooperative, in no acute distress                   Vitals:    06/15/25 2355 06/16/25 0410 06/16/25 0750 06/16/25 1121   BP: 142/69 127/84 114/85 123/68   BP Location: Right arm Right arm Left arm Left arm   Patient Position: Lying Lying Lying Lying   Pulse: 69 62 68    Resp: 16 16 16 18   Temp: 97.2 °F (36.2 °C) 97.8 °F (36.6 °C) 98.6 °F (37 °C) 97.7 °F (36.5 °C)   TempSrc: Oral Oral Oral Oral   SpO2: 98% 95% 99%    Weight:       Height:            Head:    Normocephalic, without obvious abnormality, atraumatic      Right Upper Extremity: Patient is noted to internally and externally rotate the shoulder without significant discomfort.  She does have some difficulty with forward elevation to about 120 degrees before she starts to have pain.  Strength testing was deferred today.        Left Lower Extremity:  No obvious deformity, there is a moderate to large effusion present within the knee.  She is diffuse tenderness to palpation about the knee.  She has limited range of motion 0 to about 40 degrees due to pain.  She is guarded but she does appear to be very stable to varus and valgus stress.  Difficult to assessed Lachman exam or posterior drawer due to lack of knee flexion.  She has difficulty with straight leg raise at this point.  No other outward signs of trauma or significant bruising.    bbing, edema; +2 dorsalis pedis pulse          Diagnostic Tests:    I have reviewed the labs, radiology results and diagnostic studies:  X-rays of the left knee were reviewed which show a medial compartment arthroplasty without evidence of fractures or dislocations.  No significant arthritic change in the patellofemoral or lateral compartment.    Results from last 7 days   Lab Units 06/16/25  0402   WBC 10*3/mm3 5.64   HEMOGLOBIN g/dL 11.0*   PLATELETS 10*3/mm3 175     Results from last 7 days   Lab Units 06/16/25  0402   SODIUM mmol/L 142   POTASSIUM mmol/L 4.6   CO2 mmol/L 21.0*   CREATININE mg/dL 1.25*   GLUCOSE mg/dL 97           Assessment:    Leg pain    Hypertensive disorder    Obstructive sleep apnea syndrome    Type 2 diabetes mellitus without complication    Hypothyroidism      74-year-old with acute onset of left knee pain after fall landing on the left knee.  She appears to have a hemarthrosis present within the knee which I think is a large portion of her pain.  X-rays do not suggest any fractures.  She is unable to get an MRI.  I do think this is likely a deeper bone bruise with hemarthrosis present.  Given that negative x-rays for fracture she can be weightbearing as tolerated.  She should work with physical therapy on gradual weightbearing and range of motion activities.  Will plan be for her to follow-up with me in the office in about 2 to 3 weeks for repeat exam.  Could consider further imaging at that time if she is not starting to improve as expected.  I did explain to her this could take 6 to 8 weeks for the fusion to go down and her pain symptoms to return back to their baseline.  As for the right shoulder this appears to be her baseline.  She moves the arm well.  Her symptoms did not start until about 3 days after her fall and I think this is not an acute injury but her more chronic symptoms.  She follows with another orthopedic surgeon for this and she does not seem to be interested in any further workup for this at this time.  If  symptoms change we can always readdress as needed.      Danilo Paulino MD  06/16/25  12:11 EDT

## 2025-06-16 NOTE — CASE MANAGEMENT/SOCIAL WORK
Discharge Planning Assessment  [facility]     Patient Name: Katerin Pineda  MRN: [id number]  Today's Date: 6/16/2025    Admit Date: 6/15/2025    Plan: willy YUNG @ this time   Discharge Needs Assessment       Row Name 06/16/25 1041       Living Environment    People in Home alone    Current Living Arrangements independent living facility    Potentially Unsafe Housing Conditions none    In the past 12 months has the electric, gas, oil, or water company threatened to shut off services in your home? No    Primary Care Provided by self;child(cesar);other (see comments)    Provides Primary Care For no one    Family Caregiver if Needed child(cesar), adult;sibling(s)    Family Caregiver Names Daughters  Josie and Luiza has a sibling as well    Quality of Family Relationships unable to assess    Living Arrangement Comments Currently resides in a one level apt @ [facility] independent living, on second floor has an elevator.       Resource/Environmental Concerns    Resource/Environmental Concerns none       Transportation Needs    In the past 12 months, has lack of transportation kept you from medical appointments or from getting medications? no    In the past 12 months, has lack of transportation kept you from meetings, work, or from getting things needed for daily living? No       Food Insecurity    Within the past 12 months, you worried that your food would run out before you got the money to buy more. Never true    Within the past 12 months, the food you bought just didn't last and you didn't have money to get more. Never true       Transition Planning    Patient/Family Anticipates Transition to inpatient rehabilitation facility    Patient/Family Anticipated Services at Transition     Transportation Anticipated health plan transportation       Discharge Needs Assessment    Equipment Currently Used at Home walker, rolling    Do you want help finding or keeping work or a job? I do not need or want  help    Do you want help with school or training? For example, starting or completing job training or getting a high school diploma, GED or equivalent No    Current Discharge Risk lives alone;physical impairment                   Discharge Plan       Row Name 06/16/25 1043       Plan    Plan IDP, dc TBD @ this time    Patient/Family in Agreement with Plan yes    Plan Comments I met w/Ms Edwin in room to obtain IDP, and initiate d/c planning. Insurance of [employer] confirmed. Fills scripts long term thru mail in [facility], short term [employer] in [facility], no issues verbalized obtaining medications. Ind @ baseline, lives in an independent living apt @ [place]. Denies HH, HOme O2, OPS, hx of inpatient rehab. Has been using a RW. Orthopedics consulted. PT/OT ordered, will await recs, agreeable to inpatient rehab if warranted. CM will continue to follow. MARCY HOWELL @ this time.                    Expected Discharge Date and Time       Expected Discharge Date Expected Discharge Time    Jun 19, 2025            Demographic Summary       Row Name 06/16/25 1040       General Information    Admission Type observation    Arrived From home    Referral Source emergency department    Preferred Language English    General Information Comments PCP Houston Diamond No POA/LW paperwork on file                   Functional Status       Row Name 06/16/25 1040       Functional Status    Usual Activity Tolerance good    Current Activity Tolerance fair       Physical Activity    On average, how many days per week do you engage in moderate to strenuous exercise (like a brisk walk)? 0 days    On average, how many minutes do you engage in exercise at this level? 0 min    Number of minutes of exercise per week 0       Functional Status, IADL    Medications independent    Meal Preparation independent    Housekeeping independent    Laundry independent    Shopping independent    If for any reason you need help with  day-to-day activities such as bathing, preparing meals, shopping, managing finances, etc., do you get the help you need? I need a lot more help       Employment/    Employment Status retired                   Psychosocial    No documentation.                  Abuse/Neglect    No documentation.                  Legal    No documentation.                  Substance Abuse    No documentation.                  Patient Forms    No documentation.                     Monroe Stover RN

## 2025-06-16 NOTE — PROGRESS NOTES
"          Clinical Nutrition Assessment     Patient Name: Katerin Pineda  YOB: 1950  MRN: [id number]  Date of Encounter: 06/16/25 15:08 EDT  Admission date: 6/15/2025  Reason for Visit: MST score 2+, Unintentional weight loss, Reduced oral intake    Assessment   Nutrition Assessment   Admission Diagnosis:  Leg pain [M79.606]    Problem List:    Leg pain    Hypertensive disorder    Obstructive sleep apnea syndrome    Type 2 diabetes mellitus without complication    Hypothyroidism      PMH:   She  has a past medical history of Anxiety, Disease of thyroid gland, Hyperlipidemia, Hypertension, and Sleep apnea.    PSH:  She  has a past surgical history that includes knee arthroplasty, partial replacement (Left); Uvulopalatopharyngoplasty; Other surgical history; and Wrist surgery (Right).    Applicable Nutrition History:       Anthropometrics     Height: Height: 160 cm (63\")  Last Filed Weight: Weight: 81.6 kg (180 lb) (06/15/25 1611)  Method: Weight Method: Stated  BMI: BMI (Calculated): 31.9    UBW:  180lbs  Weight change:    Weight       Weight (kg) Weight (lbs) Weight Method Visit Report   1/5/2021 86.183 kg  190 lb  Stated     1/30/2021 90.719 kg  200 lb  Stated     6/14/2025 81.647 kg  180 lb  Stated     6/15/2025 81.647 kg  180 lb  Stated       Nutrition Focused Physical Exam    Date: 6/16    Unable to perform due to Defer pending indication     Subjective   Reported/Observed/Food/Nutrition Related History:     Pt endorses wt loss and decreased intake x several yrs ago s/p COVID. Pt states she had lost wt from >200lbs but has maintained at 180lbs. Pt states taste changes contributed to decrease intake but overall does not have any current/recent nutrition concerns. Pt denies dysphagia or food allergies.     Current Nutrition Prescription   PO: Diet: Regular/House; Texture: Regular (IDDSI 7); Fluid Consistency: Thin (IDDSI 0)  Oral Nutrition Supplement:   Intake: 75% x 1 meal    Assessment & Plan "   Nutrition Diagnosis   Date:  6/16            Updated:    Problem No nutrition diagnosis at this time    Etiology    Signs/Symptoms    Status: New    Goal / Objectives:   Nutrition to support treatment and Establish PO      Nutrition Intervention      Follow treatment progress, Care plan reviewed      Monitoring/Evaluation:   Per protocol, PO intake    Beth Benjamin MS,RD,LD  Time Spent:20

## 2025-06-16 NOTE — PLAN OF CARE
Goal Outcome Evaluation:  Plan of Care Reviewed With: patient        Progress: no change  Outcome Evaluation: Pt presents with decreased functional mobility, pain with weightbearing, and decreased activity tolerance compared to baseline. Pt ambulated 15ft with min A and RW for support. Recommend continued skilled IP PT interventions. Recommend D/C to IRF.    Anticipated Discharge Disposition (PT): inpatient rehabilitation facility

## 2025-06-16 NOTE — PLAN OF CARE
Goal Outcome Evaluation:  Plan of Care Reviewed With: patient        Progress: no change  Outcome Evaluation: Pt. presents below baseline with ADLs and functional mobility. Limited by decreased activity tolerance, generalized weakness, L LE pain, and balance. Skilled OT services warranted to promote return to PLOF. Recommend IPR at discharge.    Anticipated Discharge Disposition (OT): inpatient rehabilitation facility

## 2025-06-16 NOTE — PROGRESS NOTES
[facility]  PROGRESS NOTE    Patient Name: Katerin Pineda  : 1950  MRN: [id number]    Date of Admission: 6/15/2025  Primary Care Physician: Houston Diamond MD    Subjective   Subjective     CC:  F/u knee pain    HPI:  No new issues overnight.  She states her pain is controlled when she is not moving      Objective   Objective     Vital Signs:   Temp:  [97.2 °F (36.2 °C)-98.6 °F (37 °C)] 97.7 °F (36.5 °C)  Heart Rate:  [58-82] 68  Resp:  [14-18] 18  BP: (114-146)/(65-85) 123/68     Physical Exam:  Constitutional: No acute distress, awake, alert  HENT: NCAT, mucous membranes moist  Respiratory: Clear to auscultation bilaterally, respiratory effort normal   Cardiovascular: RRR, no murmurs, rubs, or gallops  Gastrointestinal: Positive bowel sounds, soft, nontender, nondistended  Musculoskeletal: No bilateral ankle edema  Psychiatric: Appropriate affect, cooperative  Neurologic: Oriented x 3, full strength exam in LLE limited secondary to pain  Skin: No rashes      Results Reviewed:  LAB RESULTS:      Lab 25  0402 06/15/25  1703   WBC 5.64 6.11   HEMOGLOBIN 11.0* 12.4   HEMATOCRIT 34.9 37.4   PLATELETS 175 219   NEUTROS ABS  --  2.84   IMMATURE GRANS (ABS)  --  0.01   LYMPHS ABS  --  2.61   MONOS ABS  --  0.39   EOS ABS  --  0.22   MCV 98.0* 93.7         Lab 25  0402 06/15/25  1703   SODIUM 142 141   POTASSIUM 4.6 4.9   CHLORIDE 111* 109*   CO2 21.0* 20.0*   ANION GAP 10.0 12.0   BUN 24.9* 27.0*   CREATININE 1.25* 1.32*   EGFR 44.8* 41.9*   GLUCOSE 97 107*   CALCIUM 8.9 9.2   HEMOGLOBIN A1C 6.00*  --    TSH 5.730*  --          Lab 06/15/25  1703   TOTAL PROTEIN 6.6   ALBUMIN 4.2   GLOBULIN 2.4   ALT (SGPT) 12   AST (SGOT) 23   BILIRUBIN 0.5   ALK PHOS 95                     Brief Urine Lab Results       None            Microbiology Results Abnormal       None            XR Knee 1 or 2 View Left  Result Date: 2025  XR KNEE 1 OR 2 VW LEFT Date of  Exam: 6/14/2025 3:54 PM EDT Indication: fall, knee buckling Comparison: None available. Findings: Prior medial compartment knee arthroplasty. Mild degenerative changes in the patellofemoral and lateral compartments with osteophyte formation. No evidence of acute fracture or joint dislocation. Small joint effusion.     Impression: Impression: Unicompartmental knee prosthetic without hardware complication or acute osseous injury. Small joint effusion. Mild degenerative changes in the nonoperative compartments. Electronically Signed: Denzel Swanson MD  6/14/2025 4:14 PM EDT  Workstation ID: [id number]          Current medications:  Scheduled Meds:buPROPion XL, 150 mg, Oral, Daily  heparin (porcine), 5,000 Units, Subcutaneous, Q8H  levothyroxine, 25 mcg, Oral, QAM  [Held by provider] lisinopril, 40 mg, Oral, Q24H  rosuvastatin, 40 mg, Oral, Daily  sodium chloride, 10 mL, Intravenous, Q12H      Continuous Infusions:   PRN Meds:.  acetaminophen **OR** acetaminophen **OR** acetaminophen    senna-docusate sodium **AND** polyethylene glycol **AND** bisacodyl **AND** bisacodyl    ondansetron ODT **OR** ondansetron    sodium chloride    sodium chloride    traMADol    Assessment & Plan   Assessment & Plan     Active Hospital Problems    Diagnosis  POA    **Leg pain [M79.606]  Yes    Obstructive sleep apnea syndrome [G47.33]  Yes    Type 2 diabetes mellitus without complication [E11.9]  Yes    Hypothyroidism [E03.9]  Yes    Hypertensive disorder [I10]  Yes      Resolved Hospital Problems   No resolved problems to display.        Brief Hospital Course to date:  Katerin Pineda is a 74 y.o. female  with PMH of HTN, HLD, hypothyroidism, UCHE who presents with left knee pain after a fall.  X-ray negative for any acute fracture, due to her immobility orthopedics has been consulted.  PT/OT to see      Left knee pain   --Xray negative for acute findings  --unable to bear weight  --pain control  --unable to obtain MRI  --per ortho, suspects  bone bruise with hemarthrosis.  Okay from Ortho standpoint to work with PT with gradual weightbearing and range of motion activities.  Can be weightbearing as tolerated given negative x-rays.  Will need to follow-up with orthopedics in 2 to 3 weeks for repeat exam.  She understands recovery may take 6 to 8 weeks     Elevated creatinine   -- Holding ACE inhibitor, improving with IV fluids     HTN  --holding ACEi, resume when creatinine at baseline     Hypothyroidism   --cont Synthroid      DM  -- A1c is 6  --SSI for now     UCHE  --non compliant with CPAP  --monitor        Expected Discharge Location and Transportation: rehab  Expected Discharge   Expected Discharge Date: 6/19/2025; Expected Discharge Time:      VTE Prophylaxis:  Pharmacologic VTE prophylaxis orders are present.         AM-PAC 6 Clicks Score (PT): 14 (06/16/25 0800)    CODE STATUS:   Code Status and Medical Interventions: CPR (Attempt to Resuscitate); Full Support   Ordered at: 06/15/25 8788     Code Status (Patient has no pulse and is not breathing):    CPR (Attempt to Resuscitate)     Medical Interventions (Patient has pulse or is breathing):    Full Support     Level Of Support Discussed With:    Patient       Valentine Kelsey MD  06/16/25

## 2025-06-16 NOTE — THERAPY EVALUATION
Patient Name: Katerin Pineda  : 1950    MRN: [id number]                              Today's Date: 2025       Admit Date: 6/15/2025    Visit Dx:     ICD-10-CM ICD-9-CM   1. Knee strain, left, initial encounter  S86.912A 844.9   2. Unable to ambulate  R26.2 719.7   3. Hypertension, unspecified type  I10 401.9     Patient Active Problem List   Diagnosis    Hyperlipidemia    Hypertensive disorder    Obstructive sleep apnea syndrome    Type 2 diabetes mellitus without complication    Hypothyroidism    Chronic depression    Obesity    Vitamin D deficiency    Leg pain     Past Medical History:   Diagnosis Date    Anxiety     Disease of thyroid gland     Hyperlipidemia     Hypertension     Sleep apnea      Past Surgical History:   Procedure Laterality Date    KNEE ARTHROPLASTY, PARTIAL REPLACEMENT Left     OTHER SURGICAL HISTORY      blsdder control implant    UVULOPALATOPHARYNGOPLASTY      WRIST SURGERY Right       General Information       Row Name 25 1536          Physical Therapy Time and Intention    Document Type evaluation  -AE     Mode of Treatment physical therapy  -AE       Row Name 25 1536          General Information    Patient Profile Reviewed yes  -AE     Prior Level of Function independent:;all household mobility;community mobility;gait;transfer;bed mobility;ADL's;dressing;bathing;driving  -AE     Existing Precautions/Restrictions fall;other (see comments)  WBAT LLE  -AE     Barriers to Rehab previous functional deficit;medically complex  -AE       Row Name 25 1536          Living Environment    Current Living Arrangements independent living facility  [facility]  -AE     People in Home alone  -AE       Row Name 25 1536          Home Main Entrance    Number of Stairs, Main Entrance none  -AE     Stair Railings, Main Entrance none  -AE       Row Name 25 1536          Stairs Within Home, Primary    Number of Stairs, Within Home, Primary none  -AE     Stair Railings, Within  Home, Primary none  -AE       Row Name 06/16/25 1536          Cognition    Orientation Status (Cognition) oriented x 3  -AE       Row Name 06/16/25 1536          Safety Issues/Impairments Affecting Functional Mobility    Safety Issues Affecting Function (Mobility) awareness of need for assistance;insight into deficits/self-awareness;safety precaution awareness;sequencing abilities  -AE     Impairments Affecting Function (Mobility) balance;endurance/activity tolerance;strength;pain;range of motion (ROM)  -AE               User Key  (r) = Recorded By, (t) = Taken By, (c) = Cosigned By      Initials Name Provider Type    AE Diogo Dotson, PT Physical Therapist                   Mobility       Row Name 06/16/25 1538          Bed Mobility    Bed Mobility supine-sit  -AE     Supine-Sit [place] (Bed Mobility) moderate assist (50% patient effort);2 person assist;verbal cues  -AE     Assistive Device (Bed Mobility) bed rails  -AE     Comment, (Bed Mobility) VCs for hand placement and sequencing. Pt required increased cues to improve sequencing of BLE to EOB. Pt requires increased cues to improve breathing with mobility.  -AE       Row Name 06/16/25 1538          Transfers    Comment, (Transfers) VCs for hand placement and sequencing. Pt required increased education on pushing through RLE and BUE to get to upright standing and placing weight through LLE as tolerated. Good UE strength to assist.  -AE       Row Name 06/16/25 1538          Bed-Chair Transfer    Bed-Chair [place] (Transfers) minimum assist (75% patient effort);2 person assist;verbal cues  -AE     Assistive Device (Bed-Chair Transfers) walker, front-wheeled  -AE       Row Name 06/16/25 1538          Sit-Stand Transfer    Sit-Stand [place] (Transfers) minimum assist (75% patient effort);verbal cues;2 person assist  -AE     Assistive Device (Sit-Stand Transfers) walker, front-wheeled  -AE       Row Name 06/16/25 1538          Gait/Stairs  (Locomotion)    [place] Level (Gait) minimum assist (75% patient effort);2 person assist;verbal cues  -AE     Assistive Device (Gait) walker, front-wheeled  -AE     Distance in Feet (Gait) 15  -AE     Deviations/Abnormal Patterns (Gait) bilateral deviations;autumn decreased;gait speed decreased;stride length decreased;weight shifting decreased  -AE     Bilateral Gait Deviations forward flexed posture;heel strike decreased  -AE     Left Sided Gait Deviations weight shift ability decreased  -AE     Comment, (Gait/Stairs) Pt demo step through gait pattern with slow gait speed, intentional movements of BLE, and decreased weight shifting onto LLE d/t increased pain. Pt required cues to improve breathing and weight shifting while ambulating. Pt demo improved mobility with increased gait distance. Further distance limited by pain and fatigue.  -AE               User Key  (r) = Recorded By, (t) = Taken By, (c) = Cosigned By      Initials Name Provider Type    AE Diogo Dotson, PT Physical Therapist                   Obj/Interventions       Row Name 06/16/25 1543          Range of Motion Comprehensive    General Range of Motion lower extremity range of motion deficits identified  -AE     Comment, General Range of Motion LLE ROM limited d/t increased pain with flexion  -AE       Row Name 06/16/25 1543          Strength Comprehensive (MMT)    General Manual Muscle Testing (MMT) Assessment lower extremity strength deficits identified  -AE     Comment, General Manual Muscle Testing (MMT) Assessment Did not formally assess LLE; RLE 4/5  -AE       Row Name 06/16/25 1543          Balance    Balance Assessment sitting static balance;sitting dynamic balance;standing static balance;standing dynamic balance  -AE     Static Sitting Balance standby assist  -AE     Dynamic Sitting Balance standby assist  -AE     Position, Sitting Balance unsupported;sitting edge of bed  -AE     Static Standing Balance minimal assist  -AE      Dynamic Standing Balance minimal assist;verbal cues  -AE     Position/Device Used, Standing Balance supported;walker, front-wheeled  -AE       Row Name 06/16/25 1543          Sensory Assessment (Somatosensory)    Sensory Assessment (Somatosensory) LE sensation intact  -AE               User Key  (r) = Recorded By, (t) = Taken By, (c) = Cosigned By      Initials Name Provider Type    AE Diogo Dotson, PT Physical Therapist                   Goals/Plan       Row Name 06/16/25 1548          Bed Mobility Goal 1 (PT)    Activity/Assistive Device (Bed Mobility Goal 1, PT) sit to supine/supine to sit  -AE     [place] Level/Cues Needed (Bed Mobility Goal 1, PT) contact guard required  -AE     Time Frame (Bed Mobility Goal 1, PT) short term goal (STG);5 days  -AE     Progress/Outcomes (Bed Mobility Goal 1, PT) new goal  -AE       Row Name 06/16/25 1548          Transfer Goal 1 (PT)    Activity/Assistive Device (Transfer Goal 1, PT) sit-to-stand/stand-to-sit;bed-to-chair/chair-to-bed  -AE     [place] Level/Cues Needed (Transfer Goal 1, PT) contact guard required  -AE     Time Frame (Transfer Goal 1, PT) long term goal (LTG);10 days  -AE     Progress/Outcome (Transfer Goal 1, PT) new goal  -AE       Row Name 06/16/25 1548          Gait Training Goal 1 (PT)    Activity/Assistive Device (Gait Training Goal 1, PT) gait (walking locomotion);assistive device use  -AE     [place] Level (Gait Training Goal 1, PT) contact guard required  -AE     Distance (Gait Training Goal 1, PT) 50ft  -AE     Time Frame (Gait Training Goal 1, PT) long term goal (LTG);10 days  -AE     Progress/Outcome (Gait Training Goal 1, PT) new goal  -AE       Row Name 06/16/25 1548          Therapy Assessment/Plan (PT)    Planned Therapy Interventions (PT) balance training;bed mobility training;gait training;home exercise program;patient/family education;postural re-education;ROM (range of motion);strengthening;transfer training  -AE                User Key  (r) = Recorded By, (t) = Taken By, (c) = Cosigned By      Initials Name Provider Type    AE Diogo Dotson, PT Physical Therapist                   Clinical Impression       Row Name 06/16/25 1545          Pain    Pretreatment Pain Rating 2/10  -AE     Posttreatment Pain Rating 4/10  -AE     Pain Location knee  -AE     Pain Side/Orientation left  -AE     Pain Management Interventions activity modification encouraged;exercise or physical activity utilized  -AE     Response to Pain Interventions activity level improved;activity participation with increased pain  -AE     Pre/Posttreatment Pain Comment increased pain with weightbearing onto LLE  -AE       Row Name 06/16/25 1545          Plan of Care Review    Plan of Care Reviewed With patient  -AE     Progress no change  -AE     Outcome Evaluation Pt presents with decreased functional mobility, pain with weightbearing, and decreased activity tolerance compared to baseline. Pt ambulated 15ft with min A and RW for support. Recommend continued skilled IP PT interventions. Recommend D/C to IRF.  -AE       Row Name 06/16/25 1545          Therapy Assessment/Plan (PT)    Patient/Family Therapy Goals Statement (PT) Be able to walk  -AE     Rehab Potential (PT) good  -AE     Criteria for Skilled Interventions Met (PT) yes  -AE     Therapy Frequency (PT) daily  -AE     Predicted Duration of Therapy Intervention (PT) 2 weeks  -AE       Row Name 06/16/25 1545          Vital Signs    Pre Systolic BP Rehab 123  -AE     Pre Treatment Diastolic BP 68  -AE     Pretreatment Heart Rate (beats/min) 83  -AE     Intratreatment Heart Rate (beats/min) 111  -AE     Posttreatment Heart Rate (beats/min) 100  -AE     Pre SpO2 (%) 95  -AE     O2 Delivery Pre Treatment room air  -AE     O2 Delivery Intra Treatment room air  -AE     Post SpO2 (%) 93  -AE     O2 Delivery Post Treatment room air  -AE     Pre Patient Position Supine  -AE     Intra Patient Position Standing  -AE      Post Patient Position Sitting  -AE       Row Name 06/16/25 1545          Positioning and Restraints    Pre-Treatment Position in bed  -AE     Post Treatment Position chair  -AE     In Chair notified nsg;reclined;call light within reach;encouraged to call for assist;exit alarm on;legs elevated;waffle cushion  -AE               User Key  (r) = Recorded By, (t) = Taken By, (c) = Cosigned By      Initials Name Provider Type    AE Diogo Dotson, PT Physical Therapist                   Outcome Measures       Row Name 06/16/25 1549 06/16/25 0800       How much help from another person do you currently need...    Turning from your back to your side while in flat bed without using bedrails? 2  -AE 3  -TS    Moving from lying on back to sitting on the side of a flat bed without bedrails? 2  -AE 3  -TS    Moving to and from a bed to a chair (including a wheelchair)? 3  -AE 2  -TS    Standing up from a chair using your arms (e.g., wheelchair, bedside chair)? 3  -AE 3  -TS    Climbing 3-5 steps with a railing? 2  -AE 1  -TS    To walk in hospital room? 3  -AE 2  -TS    AM-PAC 6 Clicks Score (PT) 15  -AE 14  -TS    Highest Level of Mobility Goal Move to Chair/Commode-4  -AE Move to Chair/Commode-4  -TS      Row Name 06/16/25 1550 06/16/25 1549       Functional Assessment    Outcome Measure Options AM-PAC 6 Clicks Daily Activity (OT)  - AM-PAC 6 Clicks Basic Mobility (PT)  -AE              User Key  (r) = Recorded By, (t) = Taken By, (c) = Cosigned By      Initials Name Provider Type    Bianca Vaca, OT Occupational Therapist    AE Diogo Dotson, PT Physical Therapist    TS Marlyn Escobar RN Registered Nurse                                 Physical Therapy Education       Title: PT OT SLP Therapies (In Progress)       Topic: Physical Therapy (In Progress)       Point: Mobility training (In Progress)       Learning Progress Summary            Patient Acceptance, E, NR by AE at 6/16/2025 1975                       Point: Home exercise program (Not Started)       Learner Progress:  Not documented in this visit.              Point: Body mechanics (In Progress)       Learning Progress Summary            Patient Acceptance, E, NR by AE at 6/16/2025 1437                      Point: Precautions (In Progress)       Learning Progress Summary            Patient Acceptance, E, NR by AE at 6/16/2025 1437                                      User Key       Initials Effective Dates Name Provider Type Discipline    AE 09/21/21 -  Diogo Dotson PT Physical Therapist PT                  PT Recommendation and Plan  Planned Therapy Interventions (PT): balance training, bed mobility training, gait training, home exercise program, patient/family education, postural re-education, ROM (range of motion), strengthening, transfer training  Progress: no change  Outcome Evaluation: Pt presents with decreased functional mobility, pain with weightbearing, and decreased activity tolerance compared to baseline. Pt ambulated 15ft with min A and RW for support. Recommend continued skilled IP PT interventions. Recommend D/C to IRF.     Time Calculation:   PT Evaluation Complexity  History, PT Evaluation Complexity: 1-2 personal factors and/or comorbidities  Examination of Body Systems (PT Eval Complexity): total of 3 or more elements  Clinical Presentation (PT Evaluation Complexity): evolving  Clinical Decision Making (PT Evaluation Complexity): moderate complexity  Overall Complexity (PT Evaluation Complexity): moderate complexity     PT Charges       Row Name 06/16/25 1550             Time Calculation    Start Time 1437  -AE      PT Received On 06/16/25  -AE         Untimed Charges    PT Eval/Re-eval Minutes 64  -AE         Total Minutes    Untimed Charges Total Minutes 64  -AE       Total Minutes 64  -AE                User Key  (r) = Recorded By, (t) = Taken By, (c) = Cosigned By      Initials Name Provider Type    AE Diogo Dotson PT Physical  Therapist                  Therapy Charges for Today       Code Description Service Date Service Provider Modifiers Qty    [id number] HC-PT EVAL MOD COMPLEXITY 5 6/16/2025 Diogo Dotson, PT  1            PT G-Codes  Outcome Measure Options: AM-PAC 6 Clicks Daily Activity (OT)  AM-PAC 6 Clicks Score (PT): 15  AM-PAC 6 Clicks Score (OT): 16  PT Discharge Summary  Anticipated Discharge Disposition (PT): inpatient rehabilitation facility    Diogo Dotson PT  6/16/2025

## 2025-06-16 NOTE — THERAPY EVALUATION
Patient Name: Katerin Pineda  : 1950    MRN: [id number]                              Today's Date: 2025       Admit Date: 6/15/2025    Visit Dx:     ICD-10-CM ICD-9-CM   1. Knee strain, left, initial encounter  S86.912A 844.9   2. Unable to ambulate  R26.2 719.7   3. Hypertension, unspecified type  I10 401.9     Patient Active Problem List   Diagnosis    Hyperlipidemia    Hypertensive disorder    Obstructive sleep apnea syndrome    Type 2 diabetes mellitus without complication    Hypothyroidism    Chronic depression    Obesity    Vitamin D deficiency    Leg pain     Past Medical History:   Diagnosis Date    Anxiety     Disease of thyroid gland     Hyperlipidemia     Hypertension     Sleep apnea      Past Surgical History:   Procedure Laterality Date    KNEE ARTHROPLASTY, PARTIAL REPLACEMENT Left     OTHER SURGICAL HISTORY      blsdder control implant    UVULOPALATOPHARYNGOPLASTY      WRIST SURGERY Right       General Information       Row Name 25 1543          OT Time and Intention    Document Type evaluation  -     Mode of Treatment occupational therapy  -       Row Name 25 1543          General Information    Patient Profile Reviewed yes  -LC     Prior Level of Function independent:;all household mobility;transfer;ADL's  -     Existing Precautions/Restrictions fall;other (see comments)  L LE WBAT  -     Barriers to Rehab previous functional deficit  -LC       Row Name 25 1543          Living Environment    Current Living Arrangements independent living facility  -     People in Home alone  -       Row Name 25 1543          Home Main Entrance    Number of Stairs, Main Entrance none  -LC       Row Name 25 1543          Stairs Within Home, Primary    Number of Stairs, Within Home, Primary none  -LC       Row Name 25 1543          Cognition    Orientation Status (Cognition) oriented x 3  -LC       Row Name 25 1543          Safety Issues/Impairments  Affecting Functional Mobility    Safety Issues Affecting Function (Mobility) awareness of need for assistance;insight into deficits/self-awareness;safety precaution awareness;safety precautions follow-through/compliance;positioning of assistive device;sequencing abilities  -     Impairments Affecting Function (Mobility) balance;endurance/activity tolerance;pain;range of motion (ROM);strength  -               User Key  (r) = Recorded By, (t) = Taken By, (c) = Cosigned By      Initials Name Provider Type     Bianca Choudhary OT Occupational Therapist                     Mobility/ADL's       Row Name 06/16/25 1544          Bed Mobility    Bed Mobility scooting/bridging;supine-sit  -     Scooting/Bridging [place] (Bed Mobility) moderate assist (50% patient effort);2 person assist;verbal cues  -     Supine-Sit [place] (Bed Mobility) moderate assist (50% patient effort);2 person assist;verbal cues  -     Assistive Device (Bed Mobility) bed rails;head of bed elevated  -     Comment, (Bed Mobility) VC's to sequence, assist to bring B LE's to EOB and trunk into uprght posture.  -       Row Name 06/16/25 1544          Transfers    Transfers sit-stand transfer;bed-chair transfer  -     Comment, (Transfers) VC's for hand placement and sequencing. Cues to step L LE forward to improve comfort during T/Fs.  -       Row Name 06/16/25 1544          Bed-Chair Transfer    Bed-Chair [place] (Transfers) minimum assist (75% patient effort);verbal cues;2 person assist  -     Assistive Device (Bed-Chair Transfers) walker, front-wheeled  -       Row Name 06/16/25 1544          Sit-Stand Transfer    Sit-Stand [place] (Transfers) minimum assist (75% patient effort);2 person assist;verbal cues  -     Assistive Device (Sit-Stand Transfers) walker, front-wheeled  -       Row Name 06/16/25 1544          Activities of Daily Living    BADL Assessment/Intervention grooming;lower body dressing  -        Row Name 06/16/25 1544          Grooming Assessment/Training    [place] Level (Grooming) grooming skills;wash face, hands;set up  -     Position (Grooming) edge of bed sitting  -       Row Name 06/16/25 1544          Lower Body Dressing Assessment/Training    [place] Level (Lower Body Dressing) don;socks;dependent (less than 25% patient effort)  -     Position (Lower Body Dressing) edge of bed sitting  -               User Key  (r) = Recorded By, (t) = Taken By, (c) = Cosigned By      Initials Name Provider Type     Bianca Choudhary OT Occupational Therapist                   Obj/Interventions       Row Name 06/16/25 1546          Sensory Assessment (Somatosensory)    Sensory Assessment (Somatosensory) UE sensation intact  -[facility] Name 06/16/25 1546          Vision Assessment/Intervention    Visual Impairment/Limitations corrective lenses for reading  -[facility] Name 06/16/25 1546          Range of Motion Comprehensive    General Range of Motion bilateral upper extremity ROM WFL  -[facility] Name 06/16/25 1546          Strength Comprehensive (MMT)    General Manual Muscle Testing (MMT) Assessment upper extremity strength deficits identified  -[facility] Name 06/16/25 1546          Upper Extremity (Manual Muscle Testing)    Upper Extremity: Manual Muscle Testing (MMT) left UE strength is WFL;right UE strength is WFL  -       Row Name 06/16/25 1546          Motor Skills    Motor Skills functional endurance  -     Functional Endurance impaired activity tolerance  -[facility] Name 06/16/25 1546          Balance    Balance Assessment sitting static balance;sitting dynamic balance;standing static balance;standing dynamic balance  -     Static Sitting Balance standby assist  -     Dynamic Sitting Balance standby assist  -     Position, Sitting Balance unsupported;sitting edge of bed  -     Static Standing Balance minimal assist;2-person assist;verbal cues  -     Dynamic  Standing Balance minimal assist;2-person assist;verbal cues  -     Position/Device Used, Standing Balance supported;walker, front-wheeled  -     Balance Interventions sitting;standing;sit to stand;supported;occupation based/functional task;weight shifting activity  -     Comment, Balance VC's for upright posture in standing to improve stability.  -               User Key  (r) = Recorded By, (t) = Taken By, (c) = Cosigned By      Initials Name Provider Type     Bianca Choudhary, OT Occupational Therapist                   Goals/Plan       Row Name 06/16/25 1549          Transfer Goal 1 (OT)    Activity/Assistive Device (Transfer Goal 1, OT) sit-to-stand/stand-to-sit;bed-to-chair/chair-to-bed;toilet;walker, rolling  -     [place] Level/Cues Needed (Transfer Goal 1, OT) contact guard required;verbal cues required  -     Time Frame (Transfer Goal 1, OT) long term goal (LTG);10 days  -     Progress/Outcome (Transfer Goal 1, OT) goal ongoing  -[facility] Name 06/16/25 1549          Dressing Goal 1 (OT)    Activity/Device (Dressing Goal 1, OT) lower body dressing;long-handled shoe horn;reacher;sock-aid  -     [place]/Cues Needed (Dressing Goal 1, OT) moderate assist (50-74% patient effort)  -     Time Frame (Dressing Goal 1, OT) short term goal (STG);5 days  -     Progress/Outcome (Dressing Goal 1, OT) goal ongoing  -[facility] Name 06/16/25 1549          Toileting Goal 1 (OT)    Activity/Device (Toileting Goal 1, OT) adjust/manage clothing;perform perineal hygiene;commode;grab bar/safety frame  -     [place] Level/Cues Needed (Toileting Goal 1, OT) minimum assist (75% or more patient effort)  -     Time Frame (Toileting Goal 1, OT) long term goal (LTG);10 days  -     Progress/Outcome (Toileting Goal 1, OT) goal ongoing  -       Row Name 06/16/25 1541          Therapy Assessment/Plan (OT)    Planned Therapy Interventions (OT) activity tolerance training;adaptive equipment  training;BADL retraining;functional balance retraining;occupation/activity based interventions;patient/caregiver education/training;strengthening exercise;transfer/mobility retraining  -               User Key  (r) = Recorded By, (t) = Taken By, (c) = Cosigned By      Initials Name Provider Type     Bianca Choudhary, VALE Occupational Therapist                   Clinical Impression       Row Name 06/16/25 1547          Pain Assessment    Pretreatment Pain Rating 2/10  -     Posttreatment Pain Rating 4/10  -     Pain Location knee  -     Pain Side/Orientation left  -     Pain Management Interventions activity modification encouraged;positioning techniques utilized;cold applied;nursing notified  -     Response to Pain Interventions activity level improved;activity participation with increased pain;functional ability improved  -       Row Name 06/16/25 1547          Plan of Care Review    Plan of Care Reviewed With patient  -     Progress no change  -     Outcome Evaluation Pt. presents below baseline with ADLs and functional mobility. Limited by decreased activity tolerance, generalized weakness, L LE pain, and balance. Skilled OT services warranted to promote return to PLOF. Recommend IPR at discharge.  -       Row Name 06/16/25 1547          Therapy Assessment/Plan (OT)    Patient/Family Therapy Goal Statement (OT) To take care of self  -LC     Therapy Frequency (OT) daily  -     Predicted Duration of Therapy Intervention (OT) 5 days  -       Row Name 06/16/25 1547          Therapy Plan Review/Discharge Plan (OT)    Anticipated Discharge Disposition (OT) inpatient rehabilitation facility  -       Row Name 06/16/25 1547          Positioning and Restraints    Pre-Treatment Position in bed  -     Post Treatment Position chair  -LC     In Chair notified nsg;reclined;call light within reach;encouraged to call for assist;exit alarm on;waffle cushion;legs elevated  -               User Key  (r)  = Recorded By, (t) = Taken By, (c) = Cosigned By      Initials Name Provider Type    Bianca Vaca OT Occupational Therapist                   Outcome Measures       Row Name 06/16/25 1550          How much help from another is currently needed...    Putting on and taking off regular lower body clothing? 2  -LC     Bathing (including washing, rinsing, and drying) 2  -LC     Toileting (which includes using toilet bed pan or urinal) 2  -LC     Putting on and taking off regular upper body clothing 3  -LC     Taking care of personal grooming (such as brushing teeth) 3  -LC     Eating meals 4  -LC     AM-PAC 6 Clicks Score (OT) 16  -LC       Row Name 06/16/25 1549 06/16/25 0800       How much help from another person do you currently need...    Turning from your back to your side while in flat bed without using bedrails? 2  -AE 3  -TS    Moving from lying on back to sitting on the side of a flat bed without bedrails? 2  -AE 3  -TS    Moving to and from a bed to a chair (including a wheelchair)? 3  -AE 2  -TS    Standing up from a chair using your arms (e.g., wheelchair, bedside chair)? 3  -AE 3  -TS    Climbing 3-5 steps with a railing? 2  -AE 1  -TS    To walk in hospital room? 3  -AE 2  -TS    AM-PAC 6 Clicks Score (PT) 15  -AE 14  -TS    Highest Level of Mobility Goal Move to Chair/Commode-4  -AE Move to Chair/Commode-4  -TS      Row Name 06/16/25 1550 06/16/25 1549       Functional Assessment    Outcome Measure Options AM-PAC 6 Clicks Daily Activity (OT)  - AM-PAC 6 Clicks Basic Mobility (PT)  -AE              User Key  (r) = Recorded By, (t) = Taken By, (c) = Cosigned By      Initials Name Provider Type    Bianca Vaca OT Occupational Therapist    Diogo Dubois, PT Physical Therapist    Marlyn Priest RN Registered Nurse                    Occupational Therapy Education       Title: PT OT SLP Therapies (In Progress)       Topic: Occupational Therapy (In Progress)       Point: ADL training  (In Progress)       Learning Progress Summary            Patient Acceptance, E, NR by  at 6/16/2025 1442                      Point: Precautions (In Progress)       Learning Progress Summary            Patient Acceptance, E, NR by  at 6/16/2025 1442                      Point: Body mechanics (In Progress)       Learning Progress Summary            Patient Acceptance, E, NR by  at 6/16/2025 1442                                      User Key       Initials Effective Dates Name Provider Type Discipline     06/16/21 -  Bianca Choudhary, OT Occupational Therapist OT                  OT Recommendation and Plan  Planned Therapy Interventions (OT): activity tolerance training, adaptive equipment training, BADL retraining, functional balance retraining, occupation/activity based interventions, patient/caregiver education/training, strengthening exercise, transfer/mobility retraining  Therapy Frequency (OT): daily  Plan of Care Review  Plan of Care Reviewed With: patient  Progress: no change  Outcome Evaluation: Pt. presents below baseline with ADLs and functional mobility. Limited by decreased activity tolerance, generalized weakness, L LE pain, and balance. Skilled OT services warranted to promote return to PLOF. Recommend IPR at discharge.     Time Calculation:   Evaluation Complexity (OT)  Review Occupational Profile/Medical/Therapy History Complexity: brief/low complexity  Assessment, Occupational Performance/Identification of Deficit Complexity: 1-3 performance deficits  Clinical Decision Making Complexity (OT): problem focused assessment/low complexity  Overall Complexity of Evaluation (OT): low complexity     Time Calculation- OT       Row Name 06/16/25 1551             Time Calculation- OT    OT Start Time 1442  -      OT Received On 06/16/25  -      OT Goal Re-Cert Due Date 06/26/25  -         Untimed Charges    OT Eval/Re-eval Minutes 63  -         Total Minutes    Untimed Charges Total Minutes 63   -       Total Minutes 63  -                User Key  (r) = Recorded By, (t) = Taken By, (c) = Cosigned By      Initials Name Provider Type    Bianca Vaca OT Occupational Therapist                  Therapy Charges for Today       Code Description Service Date Service Provider Modifiers Qty    [id number] -OT EVAL LOW COMPLEXITY 5 6/16/2025 Bianca Choudhary OT  1                 Bianca Choudhary OT  6/16/2025

## 2025-06-17 LAB
ANION GAP SERPL CALCULATED.3IONS-SCNC: 9 MMOL/L (ref 5–15)
BASOPHILS # BLD AUTO: 0.04 10*3/MM3 (ref 0–0.2)
BASOPHILS NFR BLD AUTO: 0.7 % (ref 0–1.5)
BUN SERPL-MCNC: 22.1 MG/DL (ref 8–23)
BUN/CREAT SERPL: 17.7 (ref 7–25)
CALCIUM SPEC-SCNC: 8.8 MG/DL (ref 8.6–10.5)
CHLORIDE SERPL-SCNC: 110 MMOL/L (ref 98–107)
CO2 SERPL-SCNC: 20 MMOL/L (ref 22–29)
CREAT SERPL-MCNC: 1.25 MG/DL (ref 0.57–1)
DEPRECATED RDW RBC AUTO: 48.1 FL (ref 37–54)
EGFRCR SERPLBLD CKD-EPI 2021: 44.8 ML/MIN/1.73
EOSINOPHIL # BLD AUTO: 0.22 10*3/MM3 (ref 0–0.4)
EOSINOPHIL NFR BLD AUTO: 3.9 % (ref 0.3–6.2)
ERYTHROCYTE [DISTWIDTH] IN BLOOD BY AUTOMATED COUNT: 13.6 % (ref 12.3–15.4)
GLUCOSE BLDC GLUCOMTR-MCNC: 114 MG/DL (ref 70–130)
GLUCOSE BLDC GLUCOMTR-MCNC: 138 MG/DL (ref 70–130)
GLUCOSE BLDC GLUCOMTR-MCNC: 93 MG/DL (ref 70–130)
GLUCOSE BLDC GLUCOMTR-MCNC: 97 MG/DL (ref 70–130)
GLUCOSE SERPL-MCNC: 102 MG/DL (ref 65–99)
HCT VFR BLD AUTO: 35.1 % (ref 34–46.6)
HGB BLD-MCNC: 11.2 G/DL (ref 12–15.9)
IMM GRANULOCYTES # BLD AUTO: 0.01 10*3/MM3 (ref 0–0.05)
IMM GRANULOCYTES NFR BLD AUTO: 0.2 % (ref 0–0.5)
LYMPHOCYTES # BLD AUTO: 2.5 10*3/MM3 (ref 0.7–3.1)
LYMPHOCYTES NFR BLD AUTO: 44.8 % (ref 19.6–45.3)
MCH RBC QN AUTO: 30.6 PG (ref 26.6–33)
MCHC RBC AUTO-ENTMCNC: 31.9 G/DL (ref 31.5–35.7)
MCV RBC AUTO: 95.9 FL (ref 79–97)
MONOCYTES # BLD AUTO: 0.34 10*3/MM3 (ref 0.1–0.9)
MONOCYTES NFR BLD AUTO: 6.1 % (ref 5–12)
NEUTROPHILS NFR BLD AUTO: 2.47 10*3/MM3 (ref 1.7–7)
NEUTROPHILS NFR BLD AUTO: 44.3 % (ref 42.7–76)
NRBC BLD AUTO-RTO: 0 /100 WBC (ref 0–0.2)
PLATELET # BLD AUTO: 171 10*3/MM3 (ref 140–450)
PMV BLD AUTO: 9.7 FL (ref 6–12)
POTASSIUM SERPL-SCNC: 4.5 MMOL/L (ref 3.5–5.2)
RBC # BLD AUTO: 3.66 10*6/MM3 (ref 3.77–5.28)
SODIUM SERPL-SCNC: 139 MMOL/L (ref 136–145)
WBC NRBC COR # BLD AUTO: 5.58 10*3/MM3 (ref 3.4–10.8)

## 2025-06-17 PROCEDURE — 97116 GAIT TRAINING THERAPY: CPT

## 2025-06-17 PROCEDURE — 99232 SBSQ HOSP IP/OBS MODERATE 35: CPT | Performed by: STUDENT IN AN ORGANIZED HEALTH CARE EDUCATION/TRAINING PROGRAM

## 2025-06-17 PROCEDURE — G0378 HOSPITAL OBSERVATION PER HR: HCPCS

## 2025-06-17 PROCEDURE — 80048 BASIC METABOLIC PNL TOTAL CA: CPT | Performed by: STUDENT IN AN ORGANIZED HEALTH CARE EDUCATION/TRAINING PROGRAM

## 2025-06-17 PROCEDURE — 85025 COMPLETE CBC W/AUTO DIFF WBC: CPT | Performed by: STUDENT IN AN ORGANIZED HEALTH CARE EDUCATION/TRAINING PROGRAM

## 2025-06-17 PROCEDURE — 96372 THER/PROPH/DIAG INJ SC/IM: CPT

## 2025-06-17 PROCEDURE — 82948 REAGENT STRIP/BLOOD GLUCOSE: CPT

## 2025-06-17 PROCEDURE — 97530 THERAPEUTIC ACTIVITIES: CPT

## 2025-06-17 PROCEDURE — 25010000002 HEPARIN (PORCINE) PER 1000 UNITS: Performed by: NURSE PRACTITIONER

## 2025-06-17 RX ORDER — OXYCODONE HYDROCHLORIDE 5 MG/1
5 TABLET ORAL ONCE
Refills: 0 | Status: COMPLETED | OUTPATIENT
Start: 2025-06-17 | End: 2025-06-17

## 2025-06-17 RX ORDER — OXYCODONE HYDROCHLORIDE 5 MG/1
5 TABLET ORAL EVERY 6 HOURS PRN
Status: DISCONTINUED | OUTPATIENT
Start: 2025-06-17 | End: 2025-06-18 | Stop reason: HOSPADM

## 2025-06-17 RX ORDER — LIDOCAINE 4 G/G
2 PATCH TOPICAL
Status: DISCONTINUED | OUTPATIENT
Start: 2025-06-17 | End: 2025-06-18

## 2025-06-17 RX ADMIN — HEPARIN SODIUM 5000 UNITS: 5000 INJECTION INTRAVENOUS; SUBCUTANEOUS at 13:04

## 2025-06-17 RX ADMIN — LIDOCAINE 2 PATCH: 4 PATCH TOPICAL at 05:22

## 2025-06-17 RX ADMIN — Medication 10 ML: at 08:58

## 2025-06-17 RX ADMIN — HEPARIN SODIUM 5000 UNITS: 5000 INJECTION INTRAVENOUS; SUBCUTANEOUS at 05:21

## 2025-06-17 RX ADMIN — ROSUVASTATIN CALCIUM 40 MG: 20 TABLET, FILM COATED ORAL at 08:53

## 2025-06-17 RX ADMIN — HEPARIN SODIUM 5000 UNITS: 5000 INJECTION INTRAVENOUS; SUBCUTANEOUS at 20:47

## 2025-06-17 RX ADMIN — OXYCODONE HYDROCHLORIDE 5 MG: 5 TABLET ORAL at 05:29

## 2025-06-17 RX ADMIN — OXYCODONE 5 MG: 5 TABLET ORAL at 23:30

## 2025-06-17 RX ADMIN — OXYCODONE 5 MG: 5 TABLET ORAL at 16:58

## 2025-06-17 RX ADMIN — LEVOTHYROXINE SODIUM 25 MCG: 0.03 TABLET ORAL at 05:22

## 2025-06-17 RX ADMIN — TRAMADOL HYDROCHLORIDE 50 MG: 50 TABLET, COATED ORAL at 04:10

## 2025-06-17 RX ADMIN — OXYCODONE 5 MG: 5 TABLET ORAL at 10:39

## 2025-06-17 RX ADMIN — BUPROPION HYDROCHLORIDE 150 MG: 150 TABLET, EXTENDED RELEASE ORAL at 08:53

## 2025-06-17 NOTE — PLAN OF CARE
Goal Outcome Evaluation:  Plan of Care Reviewed With: patient        Progress: improving  Outcome Evaluation: Pt continues to present with decreased functional mobility and increased pain of LLE limiting activity tolerance. Pt ambulated 30ft with min A and RW for support. Continue to progress per pt tolerance.    Anticipated Discharge Disposition (PT): inpatient rehabilitation facility

## 2025-06-17 NOTE — CASE MANAGEMENT/SOCIAL WORK
Continued Stay Note  [facility]     Patient Name: Katerin Pineda  MRN: [id number]  Today's Date: 6/17/2025    Admit Date: 6/15/2025    Plan: rehab   Discharge Plan       Row Name 06/17/25 0954       Plan    Plan rehab    Patient/Family in Agreement with Plan yes    Plan Comments I met w/Ms. Pineda in room to review PT/OT recs from yesterday, plan for PT today. I provided Ms. Pineda a patient choice list for SNF rehabs to review and to pick 3, I will come back after lunch to see her 3 choices and call. She stated that the tramadol did not work well and no relief, RN on nightshift called on call hospitalist and she received a 1 time dose of a medication. Ms. Pineda stated that it helped tremendously and she slept, but pain was starting again. DC plan SNF rehab, will need transport. CM will continue to follow and assist w/d/c needs.    Final Discharge Disposition Code 03 - skilled nursing facility (SNF)                   Discharge Codes    No documentation.                 Expected Discharge Date and Time       Expected Discharge Date Expected Discharge Time    Jun 19, 2025               Monroe Stover RN

## 2025-06-17 NOTE — THERAPY TREATMENT NOTE
Patient Name: Katerin Pineda  : 1950    MRN: [id number]                              Today's Date: 2025       Admit Date: 6/15/2025    Visit Dx:     ICD-10-CM ICD-9-CM   1. Knee strain, left, initial encounter  S86.912A 844.9   2. Unable to ambulate  R26.2 719.7   3. Hypertension, unspecified type  I10 401.9     Patient Active Problem List   Diagnosis    Hyperlipidemia    Hypertensive disorder    Obstructive sleep apnea syndrome    Type 2 diabetes mellitus without complication    Hypothyroidism    Chronic depression    Obesity    Vitamin D deficiency    Leg pain     Past Medical History:   Diagnosis Date    Anxiety     Disease of thyroid gland     Hyperlipidemia     Hypertension     Sleep apnea      Past Surgical History:   Procedure Laterality Date    KNEE ARTHROPLASTY, PARTIAL REPLACEMENT Left     OTHER SURGICAL HISTORY      blsdder control implant    UVULOPALATOPHARYNGOPLASTY      WRIST SURGERY Right       General Information       Row Name 25 1529          Physical Therapy Time and Intention    Document Type therapy note (daily note)  -AE     Mode of Treatment physical therapy  -AE       Row Name 25 1529          General Information    Patient Profile Reviewed yes  -AE     Existing Precautions/Restrictions fall;other (see comments)  L LE WBAT  -AE     Barriers to Rehab previous functional deficit  -AE       Row Name 25 1529          Cognition    Orientation Status (Cognition) oriented x 3  -AE       Row Name 25 1529          Safety Issues/Impairments Affecting Functional Mobility    Safety Issues Affecting Function (Mobility) awareness of need for assistance;safety precaution awareness;sequencing abilities;safety precautions follow-through/compliance  -AE     Impairments Affecting Function (Mobility) balance;endurance/activity tolerance;pain;range of motion (ROM);strength  -AE               User Key  (r) = Recorded By, (t) = Taken By, (c) = Cosigned By      Initials Name  Provider Type    AE Diogo Dotson, PT Physical Therapist                   Mobility       Row Name 06/17/25 1529          Bed Mobility    Bed Mobility supine-sit  -AE     Supine-Sit [place] (Bed Mobility) moderate assist (50% patient effort);1 person assist;verbal cues  -AE     Assistive Device (Bed Mobility) bed rails;head of bed elevated  -AE     Comment, (Bed Mobility) VCs for hand placement and sequencing. Pt demo good effort with supine to sit this session.  -AE       Row Name 06/17/25 1529          Transfers    Comment, (Transfers) VCs for hand placement and sequencing. Pt required increased cues to improve upright posture and weight shifting onto LLE.  -AE       Row Name 06/17/25 1529          Sit-Stand Transfer    Sit-Stand [place] (Transfers) minimum assist (75% patient effort);moderate assist (50% patient effort);1 person assist;verbal cues  -AE     Assistive Device (Sit-Stand Transfers) walker, front-wheeled  -AE     Comment, (Sit-Stand Transfer) mod A x1 from low toilet seat, min x1 otherwise  -AE       Row Name 06/17/25 1529          Gait/Stairs (Locomotion)    [place] Level (Gait) minimum assist (75% patient effort);1 person assist;verbal cues  -AE     Assistive Device (Gait) walker, front-wheeled  -AE     Distance in Feet (Gait) 30  -AE     Deviations/Abnormal Patterns (Gait) bilateral deviations;autumn decreased;gait speed decreased;stride length decreased;weight shifting decreased  -AE     Bilateral Gait Deviations forward flexed posture;heel strike decreased  -AE     Left Sided Gait Deviations weight shift ability decreased  -AE     Comment, (Gait/Stairs) Pt demo step through gait pattern with slow, deliberate gait speed. Pt has to remind self about sequencing with use of RW to continue mobility, frequently stopping to remind herself. Pt gives good effort. Further distance limited by fatigue and pain.  -AE               User Key  (r) = Recorded By, (t) = Taken By, (c) = Cosigned  By      Initials Name Provider Type    AE Diogo Dotson, PT Physical Therapist                   Obj/Interventions       Row Name 06/17/25 1532          Motor Skills    Therapeutic Exercise knee  -AE       Row Name 06/17/25 1532          Knee (Therapeutic Exercise)    Knee (Therapeutic Exercise) AROM (active range of motion);AAROM (active assistive range of motion)  -AE     Knee AROM (Therapeutic Exercise) left;LAQ (long arc quad)  -AE     Knee AAROM (Therapeutic Exercise) left  LAQ  -AE       Row Name 06/17/25 1532          Balance    Balance Assessment sitting static balance;sitting dynamic balance;standing static balance;standing dynamic balance  -AE     Static Sitting Balance standby assist  -AE     Dynamic Sitting Balance contact guard  -AE     Position, Sitting Balance unsupported;sitting edge of bed  -AE     Static Standing Balance minimal assist;1-person assist  -AE     Dynamic Standing Balance minimal assist;1-person assist  -AE     Position/Device Used, Standing Balance supported;walker, front-wheeled  -AE               User Key  (r) = Recorded By, (t) = Taken By, (c) = Cosigned By      Initials Name Provider Type    AE Diogo Dotson PT Physical Therapist                   Goals/Plan    No documentation.                  Clinical Impression       Row Name 06/17/25 1532          Pain    Pain Location knee  -AE     Pain Side/Orientation left  -AE     Pain Management Interventions activity modification encouraged;exercise or physical activity utilized  -AE     Response to Pain Interventions activity participation with tolerable pain  -AE     Additional Documentation Pain Scale: FACES Pre/Post-Treatment (Group)  -AE       Row Name 06/17/25 1532          Pain Scale: FACES Pre/Post-Treatment    Pain: FACES Scale, Pretreatment 2-->hurts little bit  -AE     Posttreatment Pain Rating 2-->hurts little bit  -AE       Row Name 06/17/25 1532          Plan of Care Review    Plan of Care Reviewed With patient  -AE      Progress improving  -AE     Outcome Evaluation Pt continues to present with decreased functional mobility and increased pain of LLE limiting activity tolerance. Pt ambulated 30ft with min A and RW for support. Continue to progress per pt tolerance.  -AE       Row Name 06/17/25 1532          Vital Signs    Pretreatment Heart Rate (beats/min) 80  -AE     Posttreatment Heart Rate (beats/min) 92  -AE     Pre SpO2 (%) 96  -AE     O2 Delivery Pre Treatment room air  -AE     O2 Delivery Intra Treatment room air  -AE     Post SpO2 (%) 96  -AE     O2 Delivery Post Treatment room air  -AE     Pre Patient Position Supine  -AE     Intra Patient Position Standing  -AE     Post Patient Position Sitting  -AE       Row Name 06/17/25 1532          Positioning and Restraints    Pre-Treatment Position in bed  -AE     Post Treatment Position chair  -AE     In Chair notified nsg;reclined;call light within reach;encouraged to call for assist;exit alarm on;waffle cushion;legs elevated;with family/caregiver  -AE               User Key  (r) = Recorded By, (t) = Taken By, (c) = Cosigned By      Initials Name Provider Type    AE Diogo Dotson, PT Physical Therapist                   Outcome Measures       Row Name 06/17/25 1534 06/17/25 0800       How much help from another person do you currently need...    Turning from your back to your side while in flat bed without using bedrails? 3  -AE 3  -JE    Moving from lying on back to sitting on the side of a flat bed without bedrails? 2  -AE 2  -JE    Moving to and from a bed to a chair (including a wheelchair)? 3  -AE 2  -JE    Standing up from a chair using your arms (e.g., wheelchair, bedside chair)? 3  -AE 2  -JE    Climbing 3-5 steps with a railing? 2  -AE 3  -JE    To walk in hospital room? 3  -AE 2  -JE    AM-PAC 6 Clicks Score (PT) 16  -AE 14  -JE    Highest Level of Mobility Goal Stand (1 or More Minutes)-5  -AE Move to Chair/Commode-4  -JE      Row Name 06/17/25 0600 06/17/25  0400       How much help from another person do you currently need...    Turning from your back to your side while in flat bed without using bedrails? 2  -AC 2  -AC    Moving from lying on back to sitting on the side of a flat bed without bedrails? 2  -AC 2  -AC    Moving to and from a bed to a chair (including a wheelchair)? 2  -AC 2  -AC    Standing up from a chair using your arms (e.g., wheelchair, bedside chair)? 3  -AC 3  -AC    Climbing 3-5 steps with a railing? 2  -AC 2  -AC    To walk in hospital room? 2  -AC 2  -AC    AM-PAC 6 Clicks Score (PT) 13  -AC 13  -AC    Highest Level of Mobility Goal Move to Chair/Commode-4  -AC Move to Chair/Commode-4  -AC      Row Name 06/17/25 1534          Functional Assessment    Outcome Measure Options AM-PAC 6 Clicks Basic Mobility (PT)  -AE               User Key  (r) = Recorded By, (t) = Taken By, (c) = Cosigned By      Initials Name Provider Type    AE Diogo Dotson, PT Physical Therapist    Bhavna Mena, RN Registered Nurse    Julianne Freeman RN Registered Nurse                                 Physical Therapy Education       Title: PT OT SLP Therapies (In Progress)       Topic: Physical Therapy (In Progress)       Point: Mobility training (In Progress)       Learning Progress Summary            Patient Acceptance, E, NR by AE at 6/17/2025 1427    Acceptance, E, NR by AE at 6/16/2025 1437                      Point: Home exercise program (Not Started)       Learner Progress:  Not documented in this visit.              Point: Body mechanics (In Progress)       Learning Progress Summary            Patient Acceptance, E, NR by AE at 6/17/2025 1427    Acceptance, E, NR by AE at 6/16/2025 1437                      Point: Precautions (In Progress)       Learning Progress Summary            Patient Acceptance, E, NR by AE at 6/17/2025 1427    Acceptance, E, NR by AE at 6/16/2025 1437                                      User Key       Initials Effective Dates Name  Provider Type Discipline    AE 09/21/21 -  Diogo Dotson PT Physical Therapist PT                  PT Recommendation and Plan  Planned Therapy Interventions (PT): balance training, bed mobility training, gait training, home exercise program, patient/family education, postural re-education, ROM (range of motion), strengthening, transfer training  Progress: improving  Outcome Evaluation: Pt continues to present with decreased functional mobility and increased pain of LLE limiting activity tolerance. Pt ambulated 30ft with min A and RW for support. Continue to progress per pt tolerance.     Time Calculation:   PT Evaluation Complexity  History, PT Evaluation Complexity: 1-2 personal factors and/or comorbidities  Examination of Body Systems (PT Eval Complexity): total of 3 or more elements  Clinical Presentation (PT Evaluation Complexity): evolving  Clinical Decision Making (PT Evaluation Complexity): moderate complexity  Overall Complexity (PT Evaluation Complexity): moderate complexity     PT Charges       Row Name 06/17/25 1535             Time Calculation    Start Time 1427  -AE      PT Received On 06/17/25  -AE      PT Goal Re-Cert Due Date 06/26/25  -AE         Timed Charges    33235 - Gait Training Minutes  10  -AE      59541 - PT Therapeutic Activity Minutes 19  -AE         Total Minutes    Timed Charges Total Minutes 29  -AE       Total Minutes 29  -AE                User Key  (r) = Recorded By, (t) = Taken By, (c) = Cosigned By      Initials Name Provider Type    AE Diogo Dotson PT Physical Therapist                  Therapy Charges for Today       Code Description Service Date Service Provider Modifiers Qty    05907223592 HC-PT EVAL MOD COMPLEXITY 5 6/16/2025 Diogo Dotson, PT  1    [id number] HC GAIT TRAINING EA 15 MIN 6/17/2025 Diogo Dotson, PT GP 1    [id number] HC PT THERAPEUTIC ACT EA 15 MIN 6/17/2025 Diogo Dotson, PT GP 1            PT G-Codes  Outcome Measure Options: AM-PAC 6  Clicks Basic Mobility (PT)  AM-PAC 6 Clicks Score (PT): 16  AM-PAC 6 Clicks Score (OT): 16  PT Discharge Summary  Anticipated Discharge Disposition (PT): inpatient rehabilitation facility    Diogo Dotson, BRENNAN  6/17/2025

## 2025-06-17 NOTE — DISCHARGE PLACEMENT REQUEST
"Katerin Pineda (74 y.o. Female)       Date of Birth   1950    Social Security Number       Address   [address] [place] [id number]    Home Phone   [phone]    MRN   [id number]       Yazidi   None    Marital Status                               Admission Date   6/15/2025    Admission Type   Emergency    Admitting Provider   Valentine Kelsey MD    Attending Provider   Valentine Kelsey MD    Department, Room/Bed   [address], S583/1       Discharge Date       Discharge Disposition       Discharge Destination                                 Attending Provider: Valentine Kelsey MD    Allergies: No Known Allergies    Isolation: None   Infection: None   Code Status: CPR    Ht: 160 cm (63\")   Wt: 81.6 kg (180 lb)    Admission Cmt: None   Principal Problem: Leg pain [M79.606]                   Active Insurance as of 6/15/2025       Primary Coverage       Payor Plan Insurance Group Employer/Plan Group    HUMANA MEDICARE REPLACEMENT HU[employer]NA - GOLD PLUS COM- MEDICARE ADVANTAGE [employer] - NON PAR [id number]       Payor Plan Address Payor Plan Phone Number Payor Plan Fax Number Effective Dates    [address] [address]   2025 - None Entered    Formerly [facility] [address]         Subscriber Name Subscriber Birth Date Member ID       KATERIN PINEDA 1950 [id number]                     Emergency Contacts        (Rel.) Home Phone Work Phone Mobile Phone    SIDNEY ROSAS (Daughter) [phone] -- [phone]    ALO DAMON (Daughter) [phone] -- [phone]                 History & Physical        Maame, ERIK Collazo at 06/15/25 1847       Attestation signed by Mariah Wise MD at 06/15/25 2207      I have reviewed this documentation and agree.                          [facility]  HISTORY AND PHYSICAL    Patient Name: Katerin Pineda  : 1950  MRN: [id number]  Primary Care Physician: Houston Diamond MD  Date " of admission: 6/15/2025    Subjective  Subjective     Chief Complaint:  Left knee pain     HPI:  Katerin Pineda is a 74 y.o. female with PMH of HTN, HLD, hypothyroidism, UCHE presented to ED with complaints of left knee pain and inability to bear weight. States she was trying to get up into a tall truck, leg slipped, and she landed on her left knee on the floor board of the truck. Had immediate pain. Unable to bear weight without severe pain. Was seen in ED last night with imaging negative for any fractures or hardware damage. Discharged home with pain medication. States family had to return today to help get her mobile in her home, unable to bear weight due to pain. Returned to ED for evaluation. Labs with elevated creatinine of 1.3. States she was given an IV anti-inflammatory yesterday. Will admit with ortho/ PT/OT consultation.         Review of Systems   Constitutional:  Positive for activity change. Negative for chills, fatigue and fever.   HENT:  Negative for sore throat, trouble swallowing and voice change.    Eyes:  Negative for photophobia and visual disturbance.   Respiratory:  Negative for cough, shortness of breath and wheezing.    Cardiovascular:  Positive for leg swelling. Negative for chest pain and palpitations.   Gastrointestinal:  Negative for abdominal distention, abdominal pain, nausea and vomiting.   Endocrine: Negative for cold intolerance and heat intolerance.   Genitourinary:  Negative for difficulty urinating, dysuria and flank pain.   Musculoskeletal:  Positive for gait problem and joint swelling. Negative for arthralgias and back pain.   Skin:  Negative for color change, pallor and rash.   Neurological:  Negative for dizziness, syncope, facial asymmetry, speech difficulty, weakness, light-headedness and headaches.   Hematological:  Negative for adenopathy. Does not bruise/bleed easily.   Psychiatric/Behavioral:  Negative for agitation, behavioral problems and confusion.           Personal  History     Past Medical History:   Diagnosis Date    Anxiety     Disease of thyroid gland     Hyperlipidemia     Hypertension     Sleep apnea              Past Surgical History:   Procedure Laterality Date    KNEE ARTHROPLASTY, PARTIAL REPLACEMENT Left     OTHER SURGICAL HISTORY      blsdder control implant    UVULOPALATOPHARYNGOPLASTY      WRIST SURGERY Right        Family History:  family history includes Cancer in her brother; Coronary artery disease (age of onset: 60) in her sister; Heart attack (age of onset: 45) in her father; No Known Problems in her maternal grandfather, maternal grandmother, mother, paternal grandfather, and paternal grandmother; David Parkinson White syndrome in an other family member.     Social History:  reports that she has never smoked. She has never used smokeless tobacco. She reports that she does not currently use alcohol. She reports that she does not use drugs.  Social History     Social History Narrative    Caffeine: Seldom 2 glasses in week       Medications:  HYDROcodone-acetaminophen, benazepril, buPROPion XL, fluocinolone acetonide, levothyroxine, and rosuvastatin    No Known Allergies    Objective  Objective     Vital Signs:   Temp:  [98.3 °F (36.8 °C)] 98.3 °F (36.8 °C)  Heart Rate:  [65-82] 67  Resp:  [14] 14  BP: (118-143)/(72-80) 142/79    Physical Exam   Constitutional: Awake, alert  Eyes: PERRLA, sclerae anicteric, no conjunctival injection  HENT: NCAT, mucous membranes moist  Neck: Supple, no thyromegaly, no lymphadenopathy, trachea midline  Respiratory: Clear to auscultation bilaterally, nonlabored respirations   Cardiovascular: RRR, no murmurs, rubs, or gallops, palpable pedal pulses bilaterally  Gastrointestinal: Positive bowel sounds, soft, nontender, nondistended  Musculoskeletal: No bilateral ankle edema, no clubbing or cyanosis to extremities; left knee in immobility device  Psychiatric: Appropriate affect, cooperative  Neurologic: Oriented x 3, strength  symmetric in all extremities, Cranial Nerves grossly intact to confrontation, speech clear  Skin: No rashes     Result Review:  I have personally reviewed the results from the time of this admission to 6/15/2025 19:06 EDT and agree with these findings:  [x]  Laboratory list / accordion  [x]  Microbiology  [x]  Radiology  []  EKG/Telemetry   []  Cardiology/Vascular   []  Pathology  []  Old records  []  Other:  Most notable findings include:     LAB RESULTS:      Lab 06/15/25  1703   WBC 6.11   HEMOGLOBIN 12.4   HEMATOCRIT 37.4   PLATELETS 219   NEUTROS ABS 2.84   IMMATURE GRANS (ABS) 0.01   LYMPHS ABS 2.61   MONOS ABS 0.39   EOS ABS 0.22   MCV 93.7         Lab 06/15/25  1703   SODIUM 141   POTASSIUM 4.9   CHLORIDE 109*   CO2 20.0*   ANION GAP 12.0   BUN 27.0*   CREATININE 1.32*   EGFR 41.9*   GLUCOSE 107*   CALCIUM 9.2         Lab 06/15/25  1703   TOTAL PROTEIN 6.6   ALBUMIN 4.2   GLOBULIN 2.4   ALT (SGPT) 12   AST (SGOT) 23   BILIRUBIN 0.5   ALK PHOS 95                     Brief Urine Lab Results       None          Microbiology Results (last 10 days)       ** No results found for the last 240 hours. **            XR Knee 1 or 2 View Left  Result Date: 6/14/2025  XR KNEE 1 OR 2 VW LEFT Date of Exam: 6/14/2025 3:54 PM EDT Indication: fall, knee buckling Comparison: None available. Findings: Prior medial compartment knee arthroplasty. Mild degenerative changes in the patellofemoral and lateral compartments with osteophyte formation. No evidence of acute fracture or joint dislocation. Small joint effusion.     Impression: Impression: Unicompartmental knee prosthetic without hardware complication or acute osseous injury. Small joint effusion. Mild degenerative changes in the nonoperative compartments. Electronically Signed: Denzel Swanson MD  6/14/2025 4:14 PM EDT  Workstation ID: [id number]          Assessment & Plan  Assessment & Plan       Leg pain    Hypertensive disorder    Obstructive sleep apnea syndrome    Type 2  diabetes mellitus without complication    Hypothyroidism      Left knee pain   --Xray negative for acute findings  --unable to bear weight  --Ortho consultation in AM   --pain control   --per ED provider who spoke with ortho, no need for additional imaging tonight, nor NPO    Elevated creatinine   --gentle IVF overnight  --hold ACEi  --repeat labs in am   --no Toradol for now     HTN  --holding ACEi  --monitor     Hypothyroidism   --TSH in am   --cont Synthroid     DM?  --patient states not diabetic  --A1c in am     UCHE  --non compliant with CPAP  --monitor       DVT prophylaxis:  Heparin     CODE STATUS:    Code Status (Patient has no pulse and is not breathing): CPR (Attempt to Resuscitate)  Medical Interventions (Patient has pulse or is breathing): Full Support  Level Of Support Discussed With: Patient      Expected Discharge  Expected Discharge Date: 2025; Expected Discharge Time:       This note has been completed as part of a split-shared workflow.     Signature: Electronically signed by ERIK Combs, 06/15/25, 7:06 PM EDT                Electronically signed by Mariah Wise MD at 06/15/25 2207          Physician Progress Notes (most recent note)        Valentine Kelsey MD at 25 1309              [facility] Medicine Services  PROGRESS NOTE    Patient Name: Katerin Pineda  : 1950  MRN: [id number]    Date of Admission: 6/15/2025  Primary Care Physician: Houston Diamond MD    Subjective   Subjective     CC:  F/u knee pain    HPI:  She is having increased pain this morning in her right shoulder and left knee      Objective   Objective     Vital Signs:   Temp:  [97.1 °F (36.2 °C)-98.6 °F (37 °C)] 98.6 °F (37 °C)  Heart Rate:  [63-74] 70  Resp:  [16-18] 18  BP: (103-146)/(55-78) 103/55     Physical Exam:  Constitutional: No acute distress, awake, alert  HENT: NCAT, mucous membranes moist  Respiratory: Clear to auscultation bilaterally, respiratory effort  normal   Cardiovascular: RRR, no murmurs, rubs, or gallops  Gastrointestinal: Positive bowel sounds, soft, nontender, nondistended  Musculoskeletal: No bilateral ankle edema  Psychiatric: Appropriate affect, cooperative  Neurologic: Oriented x 3, full strength exam in LLE limited secondary to pain  Skin: No rashes    No significant change from 6/16    Results Reviewed:  LAB RESULTS:      Lab 06/17/25  0453 06/16/25  0402 06/15/25  1703   WBC 5.58 5.64 6.11   HEMOGLOBIN 11.2* 11.0* 12.4   HEMATOCRIT 35.1 34.9 37.4   PLATELETS 171 175 219   NEUTROS ABS 2.47  --  2.84   IMMATURE GRANS (ABS) 0.01  --  0.01   LYMPHS ABS 2.50  --  2.61   MONOS ABS 0.34  --  0.39   EOS ABS 0.22  --  0.22   MCV 95.9 98.0* 93.7         Lab 06/17/25  0453 06/16/25  0402 06/15/25  1703   SODIUM 139 142 141   POTASSIUM 4.5 4.6 4.9   CHLORIDE 110* 111* 109*   CO2 20.0* 21.0* 20.0*   ANION GAP 9.0 10.0 12.0   BUN 22.1 24.9* 27.0*   CREATININE 1.25* 1.25* 1.32*   EGFR 44.8* 44.8* 41.9*   GLUCOSE 102* 97 107*   CALCIUM 8.8 8.9 9.2   HEMOGLOBIN A1C  --  6.00*  --    TSH  --  5.730*  --          Lab 06/15/25  1703   TOTAL PROTEIN 6.6   ALBUMIN 4.2   GLOBULIN 2.4   ALT (SGPT) 12   AST (SGOT) 23   BILIRUBIN 0.5   ALK PHOS 95                     Brief Urine Lab Results       None            Microbiology Results Abnormal       None            No radiology results from the last 24 hrs          Current medications:  Scheduled Meds:buPROPion XL, 150 mg, Oral, Daily  heparin (porcine), 5,000 Units, Subcutaneous, Q8H  insulin lispro, 2-7 Units, Subcutaneous, 4x Daily AC & at Bedtime  levothyroxine, 25 mcg, Oral, QAM  Lidocaine, 2 patch, Transdermal, Q24H  [Held by provider] lisinopril, 40 mg, Oral, Q24H  rosuvastatin, 40 mg, Oral, Daily  sodium chloride, 10 mL, Intravenous, Q12H      Continuous Infusions:   PRN Meds:.  acetaminophen **OR** acetaminophen **OR** acetaminophen    senna-docusate sodium **AND** polyethylene glycol **AND** bisacodyl **AND**  bisacodyl    dextrose    dextrose    glucagon (human recombinant)    ondansetron ODT **OR** ondansetron    oxyCODONE    sodium chloride    sodium chloride    traMADol    Assessment & Plan   Assessment & Plan     Active Hospital Problems    Diagnosis  POA    **Leg pain [M79.606]  Yes    Obstructive sleep apnea syndrome [G47.33]  Yes    Type 2 diabetes mellitus without complication [E11.9]  Yes    Hypothyroidism [E03.9]  Yes    Hypertensive disorder [I10]  Yes      Resolved Hospital Problems   No resolved problems to display.        Brief Hospital Course to date:  Katerin Pineda is a 74 y.o. female  with PMH of HTN, HLD, hypothyroidism, UCHE who presents with left knee pain after a fall.  X-ray negative for any acute fracture, due to her immobility orthopedics has been consulted.  PT/OT has seen, recommends SNF      Left knee pain   --Xray negative for acute findings  --unable to bear weight  --pain control  --unable to obtain MRI  --per ortho, suspects bone bruise with hemarthrosis.  Okay from Ortho standpoint to work with PT with gradual weightbearing and range of motion activities.  Can be weightbearing as tolerated given negative x-rays.  Will need to follow-up with orthopedics in 2 to 3 weeks for repeat exam.  She understands recovery may take 6 to 8 weeks     Elevated creatinine   -- Holding ACE inhibitor, improved with IV fluids  --likely component of CKD     HTN  --holding ACEi, resume when creatinine at baseline  --suspect Cr baseline may be closer to 1.2. Cont holding acei given relative hypotension     Hypothyroidism   --cont Synthroid      DM  -- A1c is 6  --SSI for now     UCHE  --non compliant with CPAP  --monitor        Expected Discharge Location and Transportation: rehab  Expected Discharge   Expected Discharge Date: 6/19/2025; Expected Discharge Time:      VTE Prophylaxis:  Pharmacologic VTE prophylaxis orders are present.         AM-PAC 6 Clicks Score (PT): 14 (06/17/25 0800)    CODE STATUS:   Code  Status and Medical Interventions: CPR (Attempt to Resuscitate); Full Support   Ordered at: 06/15/25 1834     Code Status (Patient has no pulse and is not breathing):    CPR (Attempt to Resuscitate)     Medical Interventions (Patient has pulse or is breathing):    Full Support     Level Of Support Discussed With:    Patient       Valentine Kelsey MD  25        Electronically signed by Valentine Kelsey MD at 25 1311          Physical Therapy Notes (most recent note)        Diogo Dotson, PT at 25 1437  Version 1 of 1         Patient Name: Katerin Pineda  : 1950    MRN: [id number]                              Today's Date: 2025       Admit Date: 6/15/2025    Visit Dx:     ICD-10-CM ICD-9-CM   1. Knee strain, left, initial encounter  S86.912A 844.9   2. Unable to ambulate  R26.2 719.7   3. Hypertension, unspecified type  I10 401.9     Patient Active Problem List   Diagnosis    Hyperlipidemia    Hypertensive disorder    Obstructive sleep apnea syndrome    Type 2 diabetes mellitus without complication    Hypothyroidism    Chronic depression    Obesity    Vitamin D deficiency    Leg pain     Past Medical History:   Diagnosis Date    Anxiety     Disease of thyroid gland     Hyperlipidemia     Hypertension     Sleep apnea      Past Surgical History:   Procedure Laterality Date    KNEE ARTHROPLASTY, PARTIAL REPLACEMENT Left     OTHER SURGICAL HISTORY      blsdder control implant    UVULOPALATOPHARYNGOPLASTY      WRIST SURGERY Right       General Information       Row Name 25 1536          Physical Therapy Time and Intention    Document Type evaluation  -AE     Mode of Treatment physical therapy  -AE       Row Name 25 1536          General Information    Patient Profile Reviewed yes  -AE     Prior Level of Function independent:;all household mobility;community mobility;gait;transfer;bed mobility;ADL's;dressing;bathing;driving  -AE     Existing Precautions/Restrictions fall;other (see  comments)  WBAT LLE  -AE     Barriers to Rehab previous functional deficit;medically complex  -AE       Row Name 06/16/25 1536          Living Environment    Current Living Arrangements independent living facility  SCV  -AE     People in Home alone  -AE       Row Name 06/16/25 1536          Home Main Entrance    Number of Stairs, Main Entrance none  -AE     Stair Railings, Main Entrance none  -AE       Row Name 06/16/25 1536          Stairs Within Home, Primary    Number of Stairs, Within Home, Primary none  -AE     Stair Railings, Within Home, Primary none  -AE       Row Name 06/16/25 1536          Cognition    Orientation Status (Cognition) oriented x 3  -AE       Row Name 06/16/25 1536          Safety Issues/Impairments Affecting Functional Mobility    Safety Issues Affecting Function (Mobility) awareness of need for assistance;insight into deficits/self-awareness;safety precaution awareness;sequencing abilities  -AE     Impairments Affecting Function (Mobility) balance;endurance/activity tolerance;strength;pain;range of motion (ROM)  -AE               User Key  (r) = Recorded By, (t) = Taken By, (c) = Cosigned By      Initials Name Provider Type    AE Diogo Dotson, PT Physical Therapist                   Mobility       Row Name 06/16/25 1538          Bed Mobility    Bed Mobility supine-sit  -AE     Supine-Sit [place] (Bed Mobility) moderate assist (50% patient effort);2 person assist;verbal cues  -AE     Assistive Device (Bed Mobility) bed rails  -AE     Comment, (Bed Mobility) VCs for hand placement and sequencing. Pt required increased cues to improve sequencing of BLE to EOB. Pt requires increased cues to improve breathing with mobility.  -AE       Row Name 06/16/25 1538          Transfers    Comment, (Transfers) VCs for hand placement and sequencing. Pt required increased education on pushing through RLE and BUE to get to upright standing and placing weight through LLE as tolerated. Good UE strength  to assist.  -AE       Row Name 06/16/25 1538          Bed-Chair Transfer    Bed-Chair [place] (Transfers) minimum assist (75% patient effort);2 person assist;verbal cues  -AE     Assistive Device (Bed-Chair Transfers) walker, front-wheeled  -AE       Row Name 06/16/25 1538          Sit-Stand Transfer    Sit-Stand [place] (Transfers) minimum assist (75% patient effort);verbal cues;2 person assist  -AE     Assistive Device (Sit-Stand Transfers) walker, front-wheeled  -AE       Row Name 06/16/25 1538          Gait/Stairs (Locomotion)    [place] Level (Gait) minimum assist (75% patient effort);2 person assist;verbal cues  -AE     Assistive Device (Gait) walker, front-wheeled  -AE     Distance in Feet (Gait) 15  -AE     Deviations/Abnormal Patterns (Gait) bilateral deviations;autumn decreased;gait speed decreased;stride length decreased;weight shifting decreased  -AE     Bilateral Gait Deviations forward flexed posture;heel strike decreased  -AE     Left Sided Gait Deviations weight shift ability decreased  -AE     Comment, (Gait/Stairs) Pt demo step through gait pattern with slow gait speed, intentional movements of BLE, and decreased weight shifting onto LLE d/t increased pain. Pt required cues to improve breathing and weight shifting while ambulating. Pt demo improved mobility with increased gait distance. Further distance limited by pain and fatigue.  -AE               User Key  (r) = Recorded By, (t) = Taken By, (c) = Cosigned By      Initials Name Provider Type    Diogo Dubois PT Physical Therapist                   Obj/Interventions       Row Name 06/16/25 1543          Range of Motion Comprehensive    General Range of Motion lower extremity range of motion deficits identified  -AE     Comment, General Range of Motion LLE ROM limited d/t increased pain with flexion  -AE       Row Name 06/16/25 1549          Strength Comprehensive (MMT)    General Manual Muscle Testing (MMT) Assessment lower  extremity strength deficits identified  -AE     Comment, General Manual Muscle Testing (MMT) Assessment Did not formally assess LLE; RLE 4/5  -AE       Row Name 06/16/25 1543          Balance    Balance Assessment sitting static balance;sitting dynamic balance;standing static balance;standing dynamic balance  -AE     Static Sitting Balance standby assist  -AE     Dynamic Sitting Balance standby assist  -AE     Position, Sitting Balance unsupported;sitting edge of bed  -AE     Static Standing Balance minimal assist  -AE     Dynamic Standing Balance minimal assist;verbal cues  -AE     Position/Device Used, Standing Balance supported;walker, front-wheeled  -AE       Row Name 06/16/25 1543          Sensory Assessment (Somatosensory)    Sensory Assessment (Somatosensory) LE sensation intact  -AE               User Key  (r) = Recorded By, (t) = Taken By, (c) = Cosigned By      Initials Name Provider Type    AE Diogo Dotson, PT Physical Therapist                   Goals/Plan       Row Name 06/16/25 1548          Bed Mobility Goal 1 (PT)    Activity/Assistive Device (Bed Mobility Goal 1, PT) sit to supine/supine to sit  -AE     [place] Level/Cues Needed (Bed Mobility Goal 1, PT) contact guard required  -AE     Time Frame (Bed Mobility Goal 1, PT) short term goal (STG);5 days  -AE     Progress/Outcomes (Bed Mobility Goal 1, PT) new goal  -AE       Row Name 06/16/25 1544          Transfer Goal 1 (PT)    Activity/Assistive Device (Transfer Goal 1, PT) sit-to-stand/stand-to-sit;bed-to-chair/chair-to-bed  -AE     [place] Level/Cues Needed (Transfer Goal 1, PT) contact guard required  -AE     Time Frame (Transfer Goal 1, PT) long term goal (LTG);10 days  -AE     Progress/Outcome (Transfer Goal 1, PT) new goal  -AE       Row Name 06/16/25 1549          Gait Training Goal 1 (PT)    Activity/Assistive Device (Gait Training Goal 1, PT) gait (walking locomotion);assistive device use  -AE     [place] Level (Gait  Training Goal 1, PT) contact guard required  -AE     Distance (Gait Training Goal 1, PT) 50ft  -AE     Time Frame (Gait Training Goal 1, PT) long term goal (LTG);10 days  -AE     Progress/Outcome (Gait Training Goal 1, PT) new goal  -AE       Row Name 06/16/25 1548          Therapy Assessment/Plan (PT)    Planned Therapy Interventions (PT) balance training;bed mobility training;gait training;home exercise program;patient/family education;postural re-education;ROM (range of motion);strengthening;transfer training  -AE               User Key  (r) = Recorded By, (t) = Taken By, (c) = Cosigned By      Initials Name Provider Type    AE Diogo Dotson, PT Physical Therapist                   Clinical Impression       Row Name 06/16/25 1545          Pain    Pretreatment Pain Rating 2/10  -AE     Posttreatment Pain Rating 4/10  -AE     Pain Location knee  -AE     Pain Side/Orientation left  -AE     Pain Management Interventions activity modification encouraged;exercise or physical activity utilized  -AE     Response to Pain Interventions activity level improved;activity participation with increased pain  -AE     Pre/Posttreatment Pain Comment increased pain with weightbearing onto LLE  -AE       Row Name 06/16/25 154          Plan of Care Review    Plan of Care Reviewed With patient  -AE     Progress no change  -AE     Outcome Evaluation Pt presents with decreased functional mobility, pain with weightbearing, and decreased activity tolerance compared to baseline. Pt ambulated 15ft with min A and RW for support. Recommend continued skilled IP PT interventions. Recommend D/C to IRF.  -AE       Row Name 06/16/25 1545          Therapy Assessment/Plan (PT)    Patient/Family Therapy Goals Statement (PT) Be able to walk  -AE     Rehab Potential (PT) good  -AE     Criteria for Skilled Interventions Met (PT) yes  -AE     Therapy Frequency (PT) daily  -AE     Predicted Duration of Therapy Intervention (PT) 2 weeks  -AE       Row  Name 06/16/25 1545          Vital Signs    Pre Systolic BP Rehab 123  -AE     Pre Treatment Diastolic BP 68  -AE     Pretreatment Heart Rate (beats/min) 83  -AE     Intratreatment Heart Rate (beats/min) 111  -AE     Posttreatment Heart Rate (beats/min) 100  -AE     Pre SpO2 (%) 95  -AE     O2 Delivery Pre Treatment room air  -AE     O2 Delivery Intra Treatment room air  -AE     Post SpO2 (%) 93  -AE     O2 Delivery Post Treatment room air  -AE     Pre Patient Position Supine  -AE     Intra Patient Position Standing  -AE     Post Patient Position Sitting  -AE       Row Name 06/16/25 1545          Positioning and Restraints    Pre-Treatment Position in bed  -AE     Post Treatment Position chair  -AE     In Chair notified nsg;reclined;call light within reach;encouraged to call for assist;exit alarm on;legs elevated;waffle cushion  -AE               User Key  (r) = Recorded By, (t) = Taken By, (c) = Cosigned By      Initials Name Provider Type    AE Diogo Dotson, PT Physical Therapist                   Outcome Measures       Row Name 06/16/25 1549 06/16/25 0800       How much help from another person do you currently need...    Turning from your back to your side while in flat bed without using bedrails? 2  -AE 3  -TS    Moving from lying on back to sitting on the side of a flat bed without bedrails? 2  -AE 3  -TS    Moving to and from a bed to a chair (including a wheelchair)? 3  -AE 2  -TS    Standing up from a chair using your arms (e.g., wheelchair, bedside chair)? 3  -AE 3  -TS    Climbing 3-5 steps with a railing? 2  -AE 1  -TS    To walk in hospital room? 3  -AE 2  -TS    AM-PAC 6 Clicks Score (PT) 15  -AE 14  -TS    Highest Level of Mobility Goal Move to Chair/Commode-4  -AE Move to Chair/Commode-4  -TS      Row Name 06/16/25 1550 06/16/25 1549       Functional Assessment    Outcome Measure Options AM-PAC 6 Clicks Daily Activity (OT)  -LC AM-PAC 6 Clicks Basic Mobility (PT)  -AE              User Key  (r) =  Recorded By, (t) = Taken By, (c) = Cosigned By      Initials Name Provider Type    LC Bianca Choudhary, OT Occupational Therapist    Diogo Dubois, BRENNAN Physical Therapist    Marlyn Priest RN Registered Nurse                                 Physical Therapy Education       Title: PT OT SLP Therapies (In Progress)       Topic: Physical Therapy (In Progress)       Point: Mobility training (In Progress)       Learning Progress Summary            Patient Acceptance, E, NR by AE at 6/16/2025 1437                      Point: Home exercise program (Not Started)       Learner Progress:  Not documented in this visit.              Point: Body mechanics (In Progress)       Learning Progress Summary            Patient Acceptance, E, NR by AE at 6/16/2025 1437                      Point: Precautions (In Progress)       Learning Progress Summary            Patient Acceptance, E, NR by AE at 6/16/2025 1437                                      User Key       Initials Effective Dates Name Provider Type Discipline    AE 09/21/21 -  Diogo Dotson, PT Physical Therapist PT                  PT Recommendation and Plan  Planned Therapy Interventions (PT): balance training, bed mobility training, gait training, home exercise program, patient/family education, postural re-education, ROM (range of motion), strengthening, transfer training  Progress: no change  Outcome Evaluation: Pt presents with decreased functional mobility, pain with weightbearing, and decreased activity tolerance compared to baseline. Pt ambulated 15ft with min A and RW for support. Recommend continued skilled IP PT interventions. Recommend D/C to IRF.     Time Calculation:   PT Evaluation Complexity  History, PT Evaluation Complexity: 1-2 personal factors and/or comorbidities  Examination of Body Systems (PT Eval Complexity): total of 3 or more elements  Clinical Presentation (PT Evaluation Complexity): evolving  Clinical Decision Making (PT Evaluation  Complexity): moderate complexity  Overall Complexity (PT Evaluation Complexity): moderate complexity     PT Charges       Row Name 25 1550             Time Calculation    Start Time 1437  -AE      PT Received On 25  -AE         Untimed Charges    PT Eval/Re-eval Minutes 64  -AE         Total Minutes    Untimed Charges Total Minutes 64  -AE       Total Minutes 64  -AE                User Key  (r) = Recorded By, (t) = Taken By, (c) = Cosigned By      Initials Name Provider Type    AE Diogo Dotson, PT Physical Therapist                  Therapy Charges for Today       Code Description Service Date Service Provider Modifiers Qty    [id number] HC-PT EVAL MOD COMPLEXITY 5 2025 Diogo Dotson, PT  1            PT G-Codes  Outcome Measure Options: AM-PAC 6 Clicks Daily Activity (OT)  AM-PAC 6 Clicks Score (PT): 15  AM-PAC 6 Clicks Score (OT): 16  PT Discharge Summary  Anticipated Discharge Disposition (PT): inpatient rehabilitation facility    Diogo Dotson PT  2025      Electronically signed by Diogo Dotson, PT at 25 1551          Occupational Therapy Notes (most recent note)        Bianca Choudhary, OT at 25 1442          Patient Name: Katerin Pineda  : 1950    MRN: [id number]                              Today's Date: 2025       Admit Date: 6/15/2025    Visit Dx:     ICD-10-CM ICD-9-CM   1. Knee strain, left, initial encounter  S86.912A 844.9   2. Unable to ambulate  R26.2 719.7   3. Hypertension, unspecified type  I10 401.9     Patient Active Problem List   Diagnosis    Hyperlipidemia    Hypertensive disorder    Obstructive sleep apnea syndrome    Type 2 diabetes mellitus without complication    Hypothyroidism    Chronic depression    Obesity    Vitamin D deficiency    Leg pain     Past Medical History:   Diagnosis Date    Anxiety     Disease of thyroid gland     Hyperlipidemia     Hypertension     Sleep apnea      Past Surgical History:   Procedure Laterality  Date    KNEE ARTHROPLASTY, PARTIAL REPLACEMENT Left     OTHER SURGICAL HISTORY      blsdder control implant    UVULOPALATOPHARYNGOPLASTY      WRIST SURGERY Right       General Information       Row Name 06/16/25 1543          OT Time and Intention    Document Type evaluation  -     Mode of Treatment occupational therapy  -       Row Name 06/16/25 1543          General Information    Patient Profile Reviewed yes  -     Prior Level of Function independent:;all household mobility;transfer;ADL's  -     Existing Precautions/Restrictions fall;other (see comments)  L LE WBAT  -     Barriers to Rehab previous functional deficit  -       Row Name 06/16/25 1543          Living Environment    Current Living Arrangements independent living facility  -     People in Home alone  -       Row Name 06/16/25 1543          Home Main Entrance    Number of Stairs, Main Entrance none  -       Row Name 06/16/25 1543          Stairs Within Home, Primary    Number of Stairs, Within Home, Primary none  -[facility] Name 06/16/25 1543          Cognition    Orientation Status (Cognition) oriented x 3  -       Row Name 06/16/25 1543          Safety Issues/Impairments Affecting Functional Mobility    Safety Issues Affecting Function (Mobility) awareness of need for assistance;insight into deficits/self-awareness;safety precaution awareness;safety precautions follow-through/compliance;positioning of assistive device;sequencing abilities  -     Impairments Affecting Function (Mobility) balance;endurance/activity tolerance;pain;range of motion (ROM);strength  -               User Key  (r) = Recorded By, (t) = Taken By, (c) = Cosigned By      Initials Name Provider Type     Bianca Choudhary OT Occupational Therapist                     Mobility/ADL's       Row Name 06/16/25 1544          Bed Mobility    Bed Mobility scooting/bridging;supine-sit  -     Scooting/Bridging [place] (Bed Mobility) moderate assist (50%  patient effort);2 person assist;verbal cues  -     Supine-Sit [place] (Bed Mobility) moderate assist (50% patient effort);2 person assist;verbal cues  -     Assistive Device (Bed Mobility) bed rails;head of bed elevated  -     Comment, (Bed Mobility) VC's to sequence, assist to bring B LE's to EOB and trunk into uprght posture.  -       Row Name 06/16/25 1544          Transfers    Transfers sit-stand transfer;bed-chair transfer  -     Comment, (Transfers) VC's for hand placement and sequencing. Cues to step L LE forward to improve comfort during T/Fs.  -       Row Name 06/16/25 1544          Bed-Chair Transfer    Bed-Chair [place] (Transfers) minimum assist (75% patient effort);verbal cues;2 person assist  -     Assistive Device (Bed-Chair Transfers) walker, front-wheeled  -       Row Name 06/16/25 1544          Sit-Stand Transfer    Sit-Stand [place] (Transfers) minimum assist (75% patient effort);2 person assist;verbal cues  -     Assistive Device (Sit-Stand Transfers) walker, front-wheeled  -       Row Name 06/16/25 1544          Activities of Daily Living    BADL Assessment/Intervention grooming;lower body dressing  -       Row Name 06/16/25 1544          Grooming Assessment/Training    [place] Level (Grooming) grooming skills;wash face, hands;set up  -     Position (Grooming) edge of bed sitting  -       Row Name 06/16/25 1544          Lower Body Dressing Assessment/Training    [place] Level (Lower Body Dressing) don;socks;dependent (less than 25% patient effort)  -     Position (Lower Body Dressing) edge of bed sitting  -               User Key  (r) = Recorded By, (t) = Taken By, (c) = Cosigned By      Initials Name Provider Type     Bianca Choudhary OT Occupational Therapist                   Obj/Interventions       Row Name 06/16/25 1543          Sensory Assessment (Somatosensory)    Sensory Assessment (Somatosensory) UE sensation intact  -       Row  Name 06/16/25 1546          Vision Assessment/Intervention    Visual Impairment/Limitations corrective lenses for reading  -       Row Name 06/16/25 1546          Range of Motion Comprehensive    General Range of Motion bilateral upper extremity ROM WFL  -       Row Name 06/16/25 1546          Strength Comprehensive (MMT)    General Manual Muscle Testing (MMT) Assessment upper extremity strength deficits identified  -       Row Name 06/16/25 1546          Upper Extremity (Manual Muscle Testing)    Upper Extremity: Manual Muscle Testing (MMT) left UE strength is WFL;right UE strength is WFL  -       Row Name 06/16/25 1546          Motor Skills    Motor Skills functional endurance  -     Functional Endurance impaired activity tolerance  -       Row Name 06/16/25 1546          Balance    Balance Assessment sitting static balance;sitting dynamic balance;standing static balance;standing dynamic balance  -     Static Sitting Balance standby assist  -     Dynamic Sitting Balance standby assist  -     Position, Sitting Balance unsupported;sitting edge of bed  -     Static Standing Balance minimal assist;2-person assist;verbal cues  -     Dynamic Standing Balance minimal assist;2-person assist;verbal cues  -     Position/Device Used, Standing Balance supported;walker, front-wheeled  -     Balance Interventions sitting;standing;sit to stand;supported;occupation based/functional task;weight shifting activity  -     Comment, Balance VC's for upright posture in standing to improve stability.  -               User Key  (r) = Recorded By, (t) = Taken By, (c) = Cosigned By      Initials Name Provider Type     Bianca Choudhary OT Occupational Therapist                   Goals/Plan       Row Name 06/16/25 1549          Transfer Goal 1 (OT)    Activity/Assistive Device (Transfer Goal 1, OT) sit-to-stand/stand-to-sit;bed-to-chair/chair-to-bed;toilet;walker, rolling  -     [place] Level/Cues Needed  (Transfer Goal 1, OT) contact guard required;verbal cues required  -     Time Frame (Transfer Goal 1, OT) long term goal (LTG);10 days  -LC     Progress/Outcome (Transfer Goal 1, OT) goal ongoing  -       Row Name 06/16/25 1549          Dressing Goal 1 (OT)    Activity/Device (Dressing Goal 1, OT) lower body dressing;long-handled shoe horn;reacher;sock-aid  -     [place]/Cues Needed (Dressing Goal 1, OT) moderate assist (50-74% patient effort)  -     Time Frame (Dressing Goal 1, OT) short term goal (STG);5 days  -LC     Progress/Outcome (Dressing Goal 1, OT) goal ongoing  -       Row Name 06/16/25 1549          Toileting Goal 1 (OT)    Activity/Device (Toileting Goal 1, OT) adjust/manage clothing;perform perineal hygiene;commode;grab bar/safety frame  -     [place] Level/Cues Needed (Toileting Goal 1, OT) minimum assist (75% or more patient effort)  -     Time Frame (Toileting Goal 1, OT) long term goal (LTG);10 days  -LC     Progress/Outcome (Toileting Goal 1, OT) goal ongoing  -       Row Name 06/16/25 1549          Therapy Assessment/Plan (OT)    Planned Therapy Interventions (OT) activity tolerance training;adaptive equipment training;BADL retraining;functional balance retraining;occupation/activity based interventions;patient/caregiver education/training;strengthening exercise;transfer/mobility retraining  -               User Key  (r) = Recorded By, (t) = Taken By, (c) = Cosigned By      Initials Name Provider Type     Bianca Choudhary, OT Occupational Therapist                   Clinical Impression       Row Name 06/16/25 1547          Pain Assessment    Pretreatment Pain Rating 2/10  -     Posttreatment Pain Rating 4/10  -     Pain Location knee  -     Pain Side/Orientation left  -     Pain Management Interventions activity modification encouraged;positioning techniques utilized;cold applied;nursing notified  -     Response to Pain Interventions activity level  improved;activity participation with increased pain;functional ability improved  -       Row Name 06/16/25 1547          Plan of Care Review    Plan of Care Reviewed With patient  -     Progress no change  -     Outcome Evaluation Pt. presents below baseline with ADLs and functional mobility. Limited by decreased activity tolerance, generalized weakness, L LE pain, and balance. Skilled OT services warranted to promote return to PLOF. Recommend IPR at discharge.  -       Row Name 06/16/25 1547          Therapy Assessment/Plan (OT)    Patient/Family Therapy Goal Statement (OT) To take care of self  -LC     Therapy Frequency (OT) daily  -     Predicted Duration of Therapy Intervention (OT) 5 days  -       Row Name 06/16/25 1547          Therapy Plan Review/Discharge Plan (OT)    Anticipated Discharge Disposition (OT) inpatient rehabilitation facility  -       Row Name 06/16/25 1547          Positioning and Restraints    Pre-Treatment Position in bed  -     Post Treatment Position chair  -LC     In Chair notified nsg;reclined;call light within reach;encouraged to call for assist;exit alarm on;waffle cushion;legs elevated  -               User Key  (r) = Recorded By, (t) = Taken By, (c) = Cosigned By      Initials Name Provider Type     Bianca Choudhary, OT Occupational Therapist                   Outcome Measures       Row Name 06/16/25 5680          How much help from another is currently needed...    Putting on and taking off regular lower body clothing? 2  -LC     Bathing (including washing, rinsing, and drying) 2  -LC     Toileting (which includes using toilet bed pan or urinal) 2  -LC     Putting on and taking off regular upper body clothing 3  -LC     Taking care of personal grooming (such as brushing teeth) 3  -LC     Eating meals 4  -LC     AM-PAC 6 Clicks Score (OT) 16  -       Row Name 06/16/25 1549 06/16/25 0800       How much help from another person do you currently need...    Turning  from your back to your side while in flat bed without using bedrails? 2  -AE 3  -TS    Moving from lying on back to sitting on the side of a flat bed without bedrails? 2  -AE 3  -TS    Moving to and from a bed to a chair (including a wheelchair)? 3  -AE 2  -TS    Standing up from a chair using your arms (e.g., wheelchair, bedside chair)? 3  -AE 3  -TS    Climbing 3-5 steps with a railing? 2  -AE 1  -TS    To walk in hospital room? 3  -AE 2  -TS    AM-PAC 6 Clicks Score (PT) 15  -AE 14  -TS    Highest Level of Mobility Goal Move to Chair/Commode-4  -AE Move to Chair/Commode-4  -TS      Row Name 06/16/25 1550 06/16/25 1549       Functional Assessment    Outcome Measure Options AM-PAC 6 Clicks Daily Activity (OT)  - AM-PAC 6 Clicks Basic Mobility (PT)  -AE              User Key  (r) = Recorded By, (t) = Taken By, (c) = Cosigned By      Initials Name Provider Type     Bianca Choudhary, OT Occupational Therapist    AE Diogo Dotson, PT Physical Therapist    TS Marlyn Escobar RN Registered Nurse                    Occupational Therapy Education       Title: PT OT SLP Therapies (In Progress)       Topic: Occupational Therapy (In Progress)       Point: ADL training (In Progress)       Learning Progress Summary            Patient Acceptance, E, NR by  at 6/16/2025 1442                      Point: Precautions (In Progress)       Learning Progress Summary            Patient Acceptance, E, NR by  at 6/16/2025 1442                      Point: Body mechanics (In Progress)       Learning Progress Summary            Patient Acceptance, E, NR by  at 6/16/2025 1442                                      User Key       Initials Effective Dates Name Provider Type [facility] 06/16/21 -  Bianca Choudhary, VALE Occupational Therapist OT                  OT Recommendation and Plan  Planned Therapy Interventions (OT): activity tolerance training, adaptive equipment training, BADL retraining, functional balance retraining,  occupation/activity based interventions, patient/caregiver education/training, strengthening exercise, transfer/mobility retraining  Therapy Frequency (OT): daily  Plan of Care Review  Plan of Care Reviewed With: patient  Progress: no change  Outcome Evaluation: Pt. presents below baseline with ADLs and functional mobility. Limited by decreased activity tolerance, generalized weakness, L LE pain, and balance. Skilled OT services warranted to promote return to PLOF. Recommend IPR at discharge.     Time Calculation:   Evaluation Complexity (OT)  Review Occupational Profile/Medical/Therapy History Complexity: brief/low complexity  Assessment, Occupational Performance/Identification of Deficit Complexity: 1-3 performance deficits  Clinical Decision Making Complexity (OT): problem focused assessment/low complexity  Overall Complexity of Evaluation (OT): low complexity     Time Calculation- OT       Row Name 06/16/25 1551             Time Calculation- OT    OT Start Time 1442  -LC      OT Received On 06/16/25  -LC      OT Goal Re-Cert Due Date 06/26/25  -         Untimed Charges    OT Eval/Re-eval Minutes 63  -LC         Total Minutes    Untimed Charges Total Minutes 63  -LC       Total Minutes 63  -LC                User Key  (r) = Recorded By, (t) = Taken By, (c) = Cosigned By      Initials Name Provider Type    LC Bianca Choudhary OT Occupational Therapist                  Therapy Charges for Today       Code Description Service Date Service Provider Modifiers Qty    [id number] HC-OT EVAL LOW COMPLEXITY 5 6/16/2025 Bianca Choudhary OT  1                 Bianca Choudhary OT  6/16/2025    Electronically signed by Bianca Choudhary OT at 06/16/25 1555

## 2025-06-17 NOTE — CASE MANAGEMENT/SOCIAL WORK
Continued Stay Note  [facility]     Patient Name: Katerin Pineda  MRN: [id number]  Today's Date: 6/17/2025    Admit Date: 6/15/2025    Plan: SNF rehab   Discharge Plan       Row Name 06/17/25 6232       Plan    Plan SNF rehab    Patient/Family in Agreement with Plan yes    Plan Comments Discussed in MDR, if patient gets bed offer, can start insurance pre cert medically ready. I met Rose in room, she picked her facility preferences 1) [facility] skilled, 2) [facility], 3) [facility]. I called Jocelyn grijalva/[facility], she will check if any skilled beds available. I sent referral to Brenda @ [email or web address], and left JAN grijalva/Danita @ [facility]. Will await return contact from all then if bed offer will go ahead and send to start insurance pre cert.Jocelyn from [facility] came and saw Ms. Pineda in room, bed offer in skilled for Thursday, okay to initiate pre cert, I sent to have pre cert started.     Final Discharge Disposition Code 03 - skilled nursing facility (SNF)                   Discharge Codes    No documentation.                 Expected Discharge Date and Time       Expected Discharge Date Expected Discharge Time    Jun 19, 2025               Monroe Stover RN

## 2025-06-17 NOTE — PROGRESS NOTES
[facility] Medicine Services  PROGRESS NOTE    Patient Name: Katerin Pineda  : 1950  MRN: [id number]    Date of Admission: 6/15/2025  Primary Care Physician: Houston Diamond MD    Subjective   Subjective     CC:  F/u knee pain    HPI:  She is having increased pain this morning in her right shoulder and left knee      Objective   Objective     Vital Signs:   Temp:  [97.1 °F (36.2 °C)-98.6 °F (37 °C)] 98.6 °F (37 °C)  Heart Rate:  [63-74] 70  Resp:  [16-18] 18  BP: (103-146)/(55-78) 103/55     Physical Exam:  Constitutional: No acute distress, awake, alert  HENT: NCAT, mucous membranes moist  Respiratory: Clear to auscultation bilaterally, respiratory effort normal   Cardiovascular: RRR, no murmurs, rubs, or gallops  Gastrointestinal: Positive bowel sounds, soft, nontender, nondistended  Musculoskeletal: No bilateral ankle edema  Psychiatric: Appropriate affect, cooperative  Neurologic: Oriented x 3, full strength exam in LLE limited secondary to pain  Skin: No rashes    No significant change from     Results Reviewed:  LAB RESULTS:      Lab 25  0453 25  0402 06/15/25  1703   WBC 5.58 5.64 6.11   HEMOGLOBIN 11.2* 11.0* 12.4   HEMATOCRIT 35.1 34.9 37.4   PLATELETS 171 175 219   NEUTROS ABS 2.47  --  2.84   IMMATURE GRANS (ABS) 0.01  --  0.01   LYMPHS ABS 2.50  --  2.61   MONOS ABS 0.34  --  0.39   EOS ABS 0.22  --  0.22   MCV 95.9 98.0* 93.7         Lab 25  0453 25  0402 06/15/25  1703   SODIUM 139 142 141   POTASSIUM 4.5 4.6 4.9   CHLORIDE 110* 111* 109*   CO2 20.0* 21.0* 20.0*   ANION GAP 9.0 10.0 12.0   BUN 22.1 24.9* 27.0*   CREATININE 1.25* 1.25* 1.32*   EGFR 44.8* 44.8* 41.9*   GLUCOSE 102* 97 107*   CALCIUM 8.8 8.9 9.2   HEMOGLOBIN A1C  --  6.00*  --    TSH  --  5.730*  --          Lab 06/15/25  1703   TOTAL PROTEIN 6.6   ALBUMIN 4.2   GLOBULIN 2.4   ALT (SGPT) 12   AST (SGOT) 23   BILIRUBIN 0.5   ALK PHOS 95                     Brief Urine  Lab Results       None            Microbiology Results Abnormal       None            No radiology results from the last 24 hrs          Current medications:  Scheduled Meds:buPROPion XL, 150 mg, Oral, Daily  heparin (porcine), 5,000 Units, Subcutaneous, Q8H  insulin lispro, 2-7 Units, Subcutaneous, 4x Daily AC & at Bedtime  levothyroxine, 25 mcg, Oral, QAM  Lidocaine, 2 patch, Transdermal, Q24H  [Held by provider] lisinopril, 40 mg, Oral, Q24H  rosuvastatin, 40 mg, Oral, Daily  sodium chloride, 10 mL, Intravenous, Q12H      Continuous Infusions:   PRN Meds:.  acetaminophen **OR** acetaminophen **OR** acetaminophen    senna-docusate sodium **AND** polyethylene glycol **AND** bisacodyl **AND** bisacodyl    dextrose    dextrose    glucagon (human recombinant)    ondansetron ODT **OR** ondansetron    oxyCODONE    sodium chloride    sodium chloride    traMADol    Assessment & Plan   Assessment & Plan     Active Hospital Problems    Diagnosis  POA    **Leg pain [M79.606]  Yes    Obstructive sleep apnea syndrome [G47.33]  Yes    Type 2 diabetes mellitus without complication [E11.9]  Yes    Hypothyroidism [E03.9]  Yes    Hypertensive disorder [I10]  Yes      Resolved Hospital Problems   No resolved problems to display.        Brief Hospital Course to date:  Katerin Pineda is a 74 y.o. female  with PMH of HTN, HLD, hypothyroidism, UCHE who presents with left knee pain after a fall.  X-ray negative for any acute fracture, due to her immobility orthopedics has been consulted.  PT/OT has seen, recommends SNF      Left knee pain   --Xray negative for acute findings  --unable to bear weight  --pain control  --unable to obtain MRI  --per ortho, suspects bone bruise with hemarthrosis.  Okay from Ortho standpoint to work with PT with gradual weightbearing and range of motion activities.  Can be weightbearing as tolerated given negative x-rays.  Will need to follow-up with orthopedics in 2 to 3 weeks for repeat exam.  She understands  recovery may take 6 to 8 weeks     Elevated creatinine   -- Holding ACE inhibitor, improved with IV fluids  --likely component of CKD     HTN  --holding ACEi, resume when creatinine at baseline  --suspect Cr baseline may be closer to 1.2. Cont holding acei given relative hypotension     Hypothyroidism   --cont Synthroid      DM  -- A1c is 6  --SSI for now     UCHE  --non compliant with CPAP  --monitor        Expected Discharge Location and Transportation: rehab  Expected Discharge   Expected Discharge Date: 6/19/2025; Expected Discharge Time:      VTE Prophylaxis:  Pharmacologic VTE prophylaxis orders are present.         AM-PAC 6 Clicks Score (PT): 14 (06/17/25 0800)    CODE STATUS:   Code Status and Medical Interventions: CPR (Attempt to Resuscitate); Full Support   Ordered at: 06/15/25 4951     Code Status (Patient has no pulse and is not breathing):    CPR (Attempt to Resuscitate)     Medical Interventions (Patient has pulse or is breathing):    Full Support     Level Of Support Discussed With:    Patient       Valentine Kelsey MD  06/17/25

## 2025-06-18 VITALS
HEART RATE: 59 BPM | RESPIRATION RATE: 18 BRPM | WEIGHT: 180 LBS | OXYGEN SATURATION: 96 % | BODY MASS INDEX: 31.89 KG/M2 | DIASTOLIC BLOOD PRESSURE: 77 MMHG | TEMPERATURE: 98 F | SYSTOLIC BLOOD PRESSURE: 128 MMHG | HEIGHT: 63 IN

## 2025-06-18 LAB
ANION GAP SERPL CALCULATED.3IONS-SCNC: 12 MMOL/L (ref 5–15)
BASOPHILS # BLD AUTO: 0.03 10*3/MM3 (ref 0–0.2)
BASOPHILS NFR BLD AUTO: 0.5 % (ref 0–1.5)
BUN SERPL-MCNC: 21.1 MG/DL (ref 8–23)
BUN/CREAT SERPL: 17.2 (ref 7–25)
CALCIUM SPEC-SCNC: 9.2 MG/DL (ref 8.6–10.5)
CHLORIDE SERPL-SCNC: 107 MMOL/L (ref 98–107)
CO2 SERPL-SCNC: 22 MMOL/L (ref 22–29)
CREAT SERPL-MCNC: 1.23 MG/DL (ref 0.57–1)
DEPRECATED RDW RBC AUTO: 48.8 FL (ref 37–54)
EGFRCR SERPLBLD CKD-EPI 2021: 45.6 ML/MIN/1.73
EOSINOPHIL # BLD AUTO: 0.24 10*3/MM3 (ref 0–0.4)
EOSINOPHIL NFR BLD AUTO: 4.2 % (ref 0.3–6.2)
ERYTHROCYTE [DISTWIDTH] IN BLOOD BY AUTOMATED COUNT: 13.7 % (ref 12.3–15.4)
GLUCOSE BLDC GLUCOMTR-MCNC: 107 MG/DL (ref 70–130)
GLUCOSE SERPL-MCNC: 87 MG/DL (ref 65–99)
HCT VFR BLD AUTO: 36.7 % (ref 34–46.6)
HGB BLD-MCNC: 11.6 G/DL (ref 12–15.9)
IMM GRANULOCYTES # BLD AUTO: 0.01 10*3/MM3 (ref 0–0.05)
IMM GRANULOCYTES NFR BLD AUTO: 0.2 % (ref 0–0.5)
LYMPHOCYTES # BLD AUTO: 2.66 10*3/MM3 (ref 0.7–3.1)
LYMPHOCYTES NFR BLD AUTO: 46.2 % (ref 19.6–45.3)
MCH RBC QN AUTO: 30.6 PG (ref 26.6–33)
MCHC RBC AUTO-ENTMCNC: 31.6 G/DL (ref 31.5–35.7)
MCV RBC AUTO: 96.8 FL (ref 79–97)
MONOCYTES # BLD AUTO: 0.38 10*3/MM3 (ref 0.1–0.9)
MONOCYTES NFR BLD AUTO: 6.6 % (ref 5–12)
NEUTROPHILS NFR BLD AUTO: 2.44 10*3/MM3 (ref 1.7–7)
NEUTROPHILS NFR BLD AUTO: 42.3 % (ref 42.7–76)
NRBC BLD AUTO-RTO: 0 /100 WBC (ref 0–0.2)
PLATELET # BLD AUTO: 181 10*3/MM3 (ref 140–450)
PMV BLD AUTO: 10.1 FL (ref 6–12)
POTASSIUM SERPL-SCNC: 4.8 MMOL/L (ref 3.5–5.2)
RBC # BLD AUTO: 3.79 10*6/MM3 (ref 3.77–5.28)
SODIUM SERPL-SCNC: 141 MMOL/L (ref 136–145)
WBC NRBC COR # BLD AUTO: 5.76 10*3/MM3 (ref 3.4–10.8)

## 2025-06-18 PROCEDURE — 82948 REAGENT STRIP/BLOOD GLUCOSE: CPT

## 2025-06-18 PROCEDURE — 99239 HOSP IP/OBS DSCHRG MGMT >30: CPT | Performed by: PHYSICIAN ASSISTANT

## 2025-06-18 PROCEDURE — 85025 COMPLETE CBC W/AUTO DIFF WBC: CPT | Performed by: STUDENT IN AN ORGANIZED HEALTH CARE EDUCATION/TRAINING PROGRAM

## 2025-06-18 PROCEDURE — 80048 BASIC METABOLIC PNL TOTAL CA: CPT | Performed by: STUDENT IN AN ORGANIZED HEALTH CARE EDUCATION/TRAINING PROGRAM

## 2025-06-18 PROCEDURE — G0378 HOSPITAL OBSERVATION PER HR: HCPCS

## 2025-06-18 RX ORDER — BISACODYL 10 MG
10 SUPPOSITORY, RECTAL RECTAL DAILY PRN
Status: DISCONTINUED | OUTPATIENT
Start: 2025-06-18 | End: 2025-06-18 | Stop reason: HOSPADM

## 2025-06-18 RX ORDER — BISACODYL 5 MG/1
5 TABLET, DELAYED RELEASE ORAL DAILY PRN
Status: DISCONTINUED | OUTPATIENT
Start: 2025-06-18 | End: 2025-06-18 | Stop reason: HOSPADM

## 2025-06-18 RX ORDER — AMOXICILLIN 250 MG
2 CAPSULE ORAL NIGHTLY
Start: 2025-06-18

## 2025-06-18 RX ORDER — ACETAMINOPHEN 500 MG
1000 TABLET ORAL EVERY 8 HOURS
Start: 2025-06-18

## 2025-06-18 RX ORDER — ACETAMINOPHEN 500 MG
1000 TABLET ORAL EVERY 8 HOURS
Status: DISCONTINUED | OUTPATIENT
Start: 2025-06-18 | End: 2025-06-18 | Stop reason: HOSPADM

## 2025-06-18 RX ORDER — OXYCODONE HYDROCHLORIDE 5 MG/1
5 TABLET ORAL EVERY 4 HOURS PRN
Start: 2025-06-18

## 2025-06-18 RX ORDER — LISINOPRIL 10 MG/1
10 TABLET ORAL
Status: DISCONTINUED | OUTPATIENT
Start: 2025-06-18 | End: 2025-06-18 | Stop reason: HOSPADM

## 2025-06-18 RX ORDER — ACETAMINOPHEN 500 MG
1000 TABLET ORAL ONCE
Status: COMPLETED | OUTPATIENT
Start: 2025-06-18 | End: 2025-06-18

## 2025-06-18 RX ORDER — LISINOPRIL 10 MG/1
10 TABLET ORAL
Start: 2025-06-19

## 2025-06-18 RX ORDER — POLYETHYLENE GLYCOL 3350 17 G/17G
17 POWDER, FOR SOLUTION ORAL DAILY PRN
Status: DISCONTINUED | OUTPATIENT
Start: 2025-06-18 | End: 2025-06-18 | Stop reason: HOSPADM

## 2025-06-18 RX ORDER — AMOXICILLIN 250 MG
2 CAPSULE ORAL 2 TIMES DAILY
Status: DISCONTINUED | OUTPATIENT
Start: 2025-06-18 | End: 2025-06-18 | Stop reason: HOSPADM

## 2025-06-18 RX ADMIN — ROSUVASTATIN CALCIUM 40 MG: 20 TABLET, FILM COATED ORAL at 08:01

## 2025-06-18 RX ADMIN — Medication 10 ML: at 08:02

## 2025-06-18 RX ADMIN — DOCUSATE SODIUM 50 MG AND SENNOSIDES 8.6 MG 2 TABLET: 8.6; 5 TABLET, FILM COATED ORAL at 08:41

## 2025-06-18 RX ADMIN — ACETAMINOPHEN 1000 MG: 500 TABLET ORAL at 09:54

## 2025-06-18 RX ADMIN — BUPROPION HYDROCHLORIDE 150 MG: 150 TABLET, EXTENDED RELEASE ORAL at 08:02

## 2025-06-18 RX ADMIN — OXYCODONE 5 MG: 5 TABLET ORAL at 06:28

## 2025-06-18 RX ADMIN — LEVOTHYROXINE SODIUM 25 MCG: 0.03 TABLET ORAL at 08:02

## 2025-06-18 RX ADMIN — LISINOPRIL 10 MG: 10 TABLET ORAL at 09:53

## 2025-06-18 NOTE — DISCHARGE PLACEMENT REQUEST
"Katerin Pineda (74 y.o. Female) From [place] Stover RN  [id number]      Date of Birth   1950    Social Security Number       Address   [address] [address] [facility] [address]    Home Phone   [phone]    MRN   [id number]       Latter day   None    Marital Status                               Admission Date   6/15/2025    Admission Type   Emergency    Admitting Provider   Valentine Kelsey MD    Attending Provider   Valentine Kelsey MD    Department, Room/Bed   [address], S583/1       Discharge Date       Discharge Disposition   Skilled Nursing Facility ([place] - External)    Discharge Destination                                 Attending Provider: Valentine Kelsey MD    Allergies: No Known Allergies    Isolation: None   Infection: None   Code Status: CPR    Ht: 160 cm (63\")   Wt: 81.6 kg (180 lb)    Admission Cmt: None   Principal Problem: Leg pain [M79.606]                   Active Insurance as of 6/15/2025       Primary Coverage       Payor Plan Insurance Group Employer/Plan Group    HUMANA MEDICARE REPLACEMENT HU[employer]NA - GOLD PLUS COM- MEDICARE ADVANTAGE [employer] - NON PAR [id number]       Payor Plan Address Payor Plan Phone Number Payor Plan Fax Number Effective Dates    [address] [address]   2025 - None Entered    [facility] [phone]         Subscriber Name Subscriber Birth Date Member ID       KATERIN PINEDA 1950 [id number]                     Emergency Contacts        (Rel.) Home Phone Work Phone Mobile Phone    SIDNEY ROSAS (Daughter) [phone] -- [phone]    ALO DAMON (Daughter) [phone] -- [phone]                   Discharge Summary        Gisella Irizarry PA-C at 25 0900              [facility] Medicine Services  DISCHARGE SUMMARY    Patient Name: Katerin Pineda  : 1950  MRN: [id number]    Date of Admission: 6/15/2025  4:14 PM  Date of Discharge:  2025  Primary Care Physician: " Houston Diamond MD    Consults       Date and Time Order Name Status Description    6/15/2025  8:31 PM Inpatient Orthopedic Surgery Consult Completed     6/15/2025  5:47 PM Inpatient Orthopedic Surgery Consult            Hospital Course     Active Hospital Problems    Diagnosis  POA    **Leg pain [M79.606]  Yes    Obstructive sleep apnea syndrome [G47.33]  Yes    Type 2 diabetes mellitus without complication [E11.9]  Yes    Hypothyroidism [E03.9]  Yes    Hypertensive disorder [I10]  Yes      Resolved Hospital Problems   No resolved problems to display.      Hospital Course:  Katerin Pineda is a 74 y.o. female with PMH significant for HTN, HLD, non-insulin dependent DMII, hypothyroidism, UCHE and obesity (BMI 31). She presented to [facility] ED on 6/15/2025 for evaluation of L knee pain and inability to bear weight after a fall. She reported that while trying to get into a tall truck, she slipped and landed with her L knee on the floorboard of the truck. Had immediate pain and was unable to bear weight. Was evaluated at [facility] ED on 6/14 with negative imaging and discharged home with JOSE J Chin. Returned to ED on 6/15 due to significant mobility challenges. She was admitted to the hospital medicine service. Orthopedic surgery consulted to evaluate. \    Acute L knee pain / inability to bear weight  - XR negative for acute finidings  - Unable to obtain MRI due to bladder stimulator   - Orthopedic surgery evaluated - suspected bone bruise with hemarthrosis.   - Per ortho, ok to work with PT. WBAT and ROM as tolerated  - Scheduled APAP, PRN Oxycodone. Lidocaine patch not helpful so discontinued  - Patient counseled that full recovery may take 6-8 weeks  - Follow up with Dr. Paulino in 2-3 weeks      Elevated creatinine, likely CKD  - Concern for MALOU as last labs in chart (from 2020/21) showed creatinine ~1.0  - Creatinine 1.32 on arrival - has stabilized around 1.2  - ACEI has been on hold    HTN  -  ACEI has been on hold.   - BP has been a little soft but slowly rising  - On Benazepril 40mg daily at home. Gave Lisinopril 10mg today before DC    HLD, continue Rosuvastatin   Hypothyroidism, continue Synthroid   Diet-controlled DMII, A1c 6.0%  UCHE, noncompliant with CPAP  Obesity, complicates all aspects of care     Day of Discharge     HPI:   In bed. Has sharp waves of L knee pain but mostly comfortable at rest. Pain was much better yesterday when working with PT, however. Does note R shoulder pain     Review of Systems  Gen- No fevers, chills  CV- No chest pain, palpitations  Resp- No cough, dyspnea  GI- No N/V/D, abd pain    Vital Signs:   Temp:  [97.8 °F (36.6 °C)-98.6 °F (37 °C)] 98 °F (36.7 °C)  Heart Rate:  [55-83] 59  Resp:  [18] 18  BP: (103-155)/(55-79) 128/77    Physical Exam:  Constitutional: No acute distress, awake, alert and conversant  HENT: NCAT, mucous membranes moist  Respiratory: Clear to auscultation bilaterally, normal respiratory effort    Cardiovascular: RRR  Gastrointestinal: Positive bowel sounds, soft, nontender, nondistended  Musculoskeletal: No bilateral ankle edema. L knee effusion. Full ROM of R shoulder   Psychiatric: Appropriate affect, cooperative with exam  Neurologic: Oriented x 3, moves all extremities spontaneously without focal deficits, speech clear    Pertinent  and/or Most Recent Results     LAB RESULTS:      Lab 06/18/25  0414 06/17/25  0453 06/16/25  0402 06/15/25  1703   WBC 5.76 5.58 5.64 6.11   HEMOGLOBIN 11.6* 11.2* 11.0* 12.4   HEMATOCRIT 36.7 35.1 34.9 37.4   PLATELETS 181 171 175 219   NEUTROS ABS 2.44 2.47  --  2.84   IMMATURE GRANS (ABS) 0.01 0.01  --  0.01   LYMPHS ABS 2.66 2.50  --  2.61   MONOS ABS 0.38 0.34  --  0.39   EOS ABS 0.24 0.22  --  0.22   MCV 96.8 95.9 98.0* 93.7         Lab 06/18/25  0415 06/17/25  0453 06/16/25  0402 06/15/25  1703   SODIUM 141 139 142 141   POTASSIUM 4.8 4.5 4.6 4.9   CHLORIDE 107 110* 111* 109*   CO2 22.0 20.0* 21.0* 20.0*    ANION GAP 12.0 9.0 10.0 12.0   BUN 21.1 22.1 24.9* 27.0*   CREATININE 1.23* 1.25* 1.25* 1.32*   EGFR 45.6* 44.8* 44.8* 41.9*   GLUCOSE 87 102* 97 107*   CALCIUM 9.2 8.8 8.9 9.2   HEMOGLOBIN A1C  --   --  6.00*  --    TSH  --   --  5.730*  --          Lab 06/15/25  1703   TOTAL PROTEIN 6.6   ALBUMIN 4.2   GLOBULIN 2.4   ALT (SGPT) 12   AST (SGOT) 23   BILIRUBIN 0.5   ALK PHOS 95     Brief Urine Lab Results       None          Microbiology Results (last 10 days)       ** No results found for the last 240 hours. **          XR Knee 1 or 2 View Left  Result Date: 6/14/2025  XR KNEE 1 OR 2 VW LEFT Date of Exam: 6/14/2025 3:54 PM EDT Indication: fall, knee buckling Comparison: None available. Findings: Prior medial compartment knee arthroplasty. Mild degenerative changes in the patellofemoral and lateral compartments with osteophyte formation. No evidence of acute fracture or joint dislocation. Small joint effusion.     Impression: Unicompartmental knee prosthetic without hardware complication or acute osseous injury. Small joint effusion. Mild degenerative changes in the nonoperative compartments. Electronically Signed: Denzel Swanson MD  6/14/2025 4:14 PM EDT  Workstation ID: [id number]    Discharge Details        Discharge Medications        PAUSE taking these medications        Instructions Start Date   benazepril 40 MG tablet  Wait to take this until your doctor or other care provider tells you to start again.  Commonly known as: LOTENSIN   40 mg, Daily             New Medications        Instructions Start Date   acetaminophen 500 MG tablet  Commonly known as: TYLENOL   1,000 mg, Oral, Every 8 Hours      lisinopril 10 MG tablet  Commonly known as: PRINIVIL,ZESTRIL   10 mg, Oral, Every 24 Hours Scheduled   Start Date: June 19, 2025     oxyCODONE 5 MG immediate release tablet  Commonly known as: ROXICODONE   5 mg, Oral, Every 4 Hours PRN      sennosides-docusate 8.6-50 MG per tablet  Commonly known as: PERICOLACE   2  tablets, Oral, Nightly             Continue These Medications        Instructions Start Date   buPROPion  MG 24 hr tablet  Commonly known as: WELLBUTRIN XL   150 mg, Daily      DULoxetine 60 MG capsule  Commonly known as: CYMBALTA   60 mg, Oral, Daily      levothyroxine 25 MCG tablet  Commonly known as: SYNTHROID, LEVOTHROID   Take 1 tablet by mouth Every Morning.      rosuvastatin 40 MG tablet  Commonly known as: CRESTOR   40 mg, Daily             Stop These Medications      fluocinolone acetonide 0.01 % oil otic oil  Commonly known as: DERMOTIC     HYDROcodone-acetaminophen 5-325 MG per tablet  Commonly known as: NORCO            No Known Allergies    Discharge Disposition:Skilled Nursing Facility ([place] - External)    Diet:  Diet Instructions       Diet: Regular/House Diet, Cardiac Diets; Healthy Heart (2-3 Na+); Regular (IDDSI 7); Thin (IDDSI 0)      Discharge Diet:  Regular/House Diet  Cardiac Diets       Cardiac Diet: Healthy Heart (2-3 Na+)    Texture: Regular (IDDSI 7)    Fluid Consistency: Thin (IDDSI 0)           Activity:  Activity Instructions       Activity as Tolerated      Up WIth Assist            CODE STATUS:    Code Status and Medical Interventions: CPR (Attempt to Resuscitate); Full Support   Ordered at: 06/15/25 6161     Code Status (Patient has no pulse and is not breathing):    CPR (Attempt to Resuscitate)     Medical Interventions (Patient has pulse or is breathing):    Full Support     Level Of Support Discussed With:    Patient     No future appointments.    Gisella Irizarry PA-C  06/18/25    Time Spent on Discharge:  I spent 35 minutes on this discharge activity which included: face-to-face encounter with the patient, reviewing the data in the system, coordination of the care with the nursing staff as well as consultants, documentation, and entering orders.            Electronically signed by Gisella Irizarry PA-C at 06/18/25 0902

## 2025-06-18 NOTE — DISCHARGE SUMMARY
[facility] Medicine Services  DISCHARGE SUMMARY    Patient Name: Katerin Pineda  : 1950  MRN: [id number]    Date of Admission: 6/15/2025  4:14 PM  Date of Discharge:  2025  Primary Care Physician: Houston Diamond MD    Consults       Date and Time Order Name Status Description    6/15/2025  8:31 PM Inpatient Orthopedic Surgery Consult Completed     6/15/2025  5:47 PM Inpatient Orthopedic Surgery Consult            Hospital Course     Active Hospital Problems    Diagnosis  POA    **Leg pain [M79.606]  Yes    Obstructive sleep apnea syndrome [G47.33]  Yes    Type 2 diabetes mellitus without complication [E11.9]  Yes    Hypothyroidism [E03.9]  Yes    Hypertensive disorder [I10]  Yes      Resolved Hospital Problems   No resolved problems to display.      Hospital Course:  Katerin Pineda is a 74 y.o. female with PMH significant for HTN, HLD, non-insulin dependent DMII, hypothyroidism, UCHE and obesity (BMI 31). She presented to [facility] ED on 6/15/2025 for evaluation of L knee pain and inability to bear weight after a fall. She reported that while trying to get into a tall truck, she slipped and landed with her L knee on the floorboard of the truck. Had immediate pain and was unable to bear weight. Was evaluated at [facility] ED on  with negative imaging and discharged home with JOSE J Chin. Returned to ED on 6/15 due to significant mobility challenges. She was admitted to the hospital medicine service. Orthopedic surgery consulted to evaluate. \    Acute L knee pain / inability to bear weight  - XR negative for acute finidings  - Unable to obtain MRI due to bladder stimulator   - Orthopedic surgery evaluated - suspected bone bruise with hemarthrosis.   - Per ortho, ok to work with PT. WBAT and ROM as tolerated  - Scheduled APAP, PRN Oxycodone. Lidocaine patch not helpful so discontinued  - Patient counseled that full recovery may take 6-8 weeks  - Follow up with  Dr. Paulino in 2-3 weeks      Elevated creatinine, likely CKD  - Concern for MALOU as last labs in chart (from 2020/21) showed creatinine ~1.0  - Creatinine 1.32 on arrival - has stabilized around 1.2  - ACEI has been on hold    HTN  - ACEI has been on hold.   - BP has been a little soft but slowly rising  - On Benazepril 40mg daily at home. Gave Lisinopril 10mg today before DC    HLD, continue Rosuvastatin   Hypothyroidism, continue Synthroid   Diet-controlled DMII, A1c 6.0%  UCHE, noncompliant with CPAP  Obesity, complicates all aspects of care     Day of Discharge     HPI:   In bed. Has sharp waves of L knee pain but mostly comfortable at rest. Pain was much better yesterday when working with PT, however. Does note R shoulder pain     Review of Systems  Gen- No fevers, chills  CV- No chest pain, palpitations  Resp- No cough, dyspnea  GI- No N/V/D, abd pain    Vital Signs:   Temp:  [97.8 °F (36.6 °C)-98.6 °F (37 °C)] 98 °F (36.7 °C)  Heart Rate:  [55-83] 59  Resp:  [18] 18  BP: (103-155)/(55-79) 128/77    Physical Exam:  Constitutional: No acute distress, awake, alert and conversant  HENT: NCAT, mucous membranes moist  Respiratory: Clear to auscultation bilaterally, normal respiratory effort    Cardiovascular: RRR  Gastrointestinal: Positive bowel sounds, soft, nontender, nondistended  Musculoskeletal: No bilateral ankle edema. L knee effusion. Full ROM of R shoulder   Psychiatric: Appropriate affect, cooperative with exam  Neurologic: Oriented x 3, moves all extremities spontaneously without focal deficits, speech clear    Pertinent  and/or Most Recent Results     LAB RESULTS:      Lab 06/18/25  0414 06/17/25  0453 06/16/25  0402 06/15/25  1703   WBC 5.76 5.58 5.64 6.11   HEMOGLOBIN 11.6* 11.2* 11.0* 12.4   HEMATOCRIT 36.7 35.1 34.9 37.4   PLATELETS 181 171 175 219   NEUTROS ABS 2.44 2.47  --  2.84   IMMATURE GRANS (ABS) 0.01 0.01  --  0.01   LYMPHS ABS 2.66 2.50  --  2.61   MONOS ABS 0.38 0.34  --  0.39   EOS ABS  0.24 0.22  --  0.22   MCV 96.8 95.9 98.0* 93.7         Lab 06/18/25  0415 06/17/25  0453 06/16/25  0402 06/15/25  1703   SODIUM 141 139 142 141   POTASSIUM 4.8 4.5 4.6 4.9   CHLORIDE 107 110* 111* 109*   CO2 22.0 20.0* 21.0* 20.0*   ANION GAP 12.0 9.0 10.0 12.0   BUN 21.1 22.1 24.9* 27.0*   CREATININE 1.23* 1.25* 1.25* 1.32*   EGFR 45.6* 44.8* 44.8* 41.9*   GLUCOSE 87 102* 97 107*   CALCIUM 9.2 8.8 8.9 9.2   HEMOGLOBIN A1C  --   --  6.00*  --    TSH  --   --  5.730*  --          Lab 06/15/25  1703   TOTAL PROTEIN 6.6   ALBUMIN 4.2   GLOBULIN 2.4   ALT (SGPT) 12   AST (SGOT) 23   BILIRUBIN 0.5   ALK PHOS 95     Brief Urine Lab Results       None          Microbiology Results (last 10 days)       ** No results found for the last 240 hours. **          XR Knee 1 or 2 View Left  Result Date: 6/14/2025  XR KNEE 1 OR 2 VW LEFT Date of Exam: 6/14/2025 3:54 PM EDT Indication: fall, knee buckling Comparison: None available. Findings: Prior medial compartment knee arthroplasty. Mild degenerative changes in the patellofemoral and lateral compartments with osteophyte formation. No evidence of acute fracture or joint dislocation. Small joint effusion.     Impression: Unicompartmental knee prosthetic without hardware complication or acute osseous injury. Small joint effusion. Mild degenerative changes in the nonoperative compartments. Electronically Signed: Denzel Swanson MD  6/14/2025 4:14 PM EDT  Workstation ID: [id number]    Discharge Details        Discharge Medications        PAUSE taking these medications        Instructions Start Date   benazepril 40 MG tablet  Wait to take this until your doctor or other care provider tells you to start again.  Commonly known as: LOTENSIN   40 mg, Daily             New Medications        Instructions Start Date   acetaminophen 500 MG tablet  Commonly known as: TYLENOL   1,000 mg, Oral, Every 8 Hours      lisinopril 10 MG tablet  Commonly known as: PRINIVIL,ZESTRIL   10 mg, Oral, Every 24  Hours Scheduled   Start Date: June 19, 2025     oxyCODONE 5 MG immediate release tablet  Commonly known as: ROXICODONE   5 mg, Oral, Every 4 Hours PRN      sennosides-docusate 8.6-50 MG per tablet  Commonly known as: PERICOLACE   2 tablets, Oral, Nightly             Continue These Medications        Instructions Start Date   buPROPion  MG 24 hr tablet  Commonly known as: WELLBUTRIN XL   150 mg, Daily      DULoxetine 60 MG capsule  Commonly known as: CYMBALTA   60 mg, Oral, Daily      levothyroxine 25 MCG tablet  Commonly known as: SYNTHROID, LEVOTHROID   Take 1 tablet by mouth Every Morning.      rosuvastatin 40 MG tablet  Commonly known as: CRESTOR   40 mg, Daily             Stop These Medications      fluocinolone acetonide 0.01 % oil otic oil  Commonly known as: DERMOTIC     HYDROcodone-acetaminophen 5-325 MG per tablet  Commonly known as: NORCO            No Known Allergies    Discharge Disposition:Skilled Nursing Facility ([place] - External)    Diet:  Diet Instructions       Diet: Regular/House Diet, Cardiac Diets; Healthy Heart (2-3 Na+); Regular (IDDSI 7); Thin (IDDSI 0)      Discharge Diet:  Regular/House Diet  Cardiac Diets       Cardiac Diet: Healthy Heart (2-3 Na+)    Texture: Regular (IDDSI 7)    Fluid Consistency: Thin (IDDSI 0)           Activity:  Activity Instructions       Activity as Tolerated      Up WIth Assist            CODE STATUS:    Code Status and Medical Interventions: CPR (Attempt to Resuscitate); Full Support   Ordered at: 06/15/25 1834     Code Status (Patient has no pulse and is not breathing):    CPR (Attempt to Resuscitate)     Medical Interventions (Patient has pulse or is breathing):    Full Support     Level Of Support Discussed With:    Patient     No future appointments.    Gisella Irizarry PA-C  06/18/25    Time Spent on Discharge:  I spent 35 minutes on this discharge activity which included: face-to-face encounter with the patient, reviewing the data in the system,  coordination of the care with the nursing staff as well as consultants, documentation, and entering orders.

## 2025-06-18 NOTE — CASE MANAGEMENT/SOCIAL WORK
Case Management Discharge Note      Final Note: Ms. Pineda had insurance approval for SNF rehab @ [facility] late Tuesday afternoon. I spoke w/Jocelyn issa @ [facility] this am, [facility] has bed for today. I messaged Gisella DE SANTIAGO, after assessing, deemed Ms. Pineda ok to transfer to [facility] today. RN to call report to [phone] and send d/c packet w/patient. I will fax d/c summary to [phone] when completed in Epic. I have arranged wheelchair transport w/[employer] wheelchair van service for 1415 PM today, they will come to her room for pickup. I met w/Ms. Pineda in room, she remains agreeable to SNF rehab @ [facility] and agreeable to above d/c plan for today. No other d/c needs expressed currently.         Selected Continued Care - Admitted Since 6/15/2025       Destination Coordination complete.      Service Provider Services Address Phone Fax Patient Preferred    [facility] Nursing [address] [address] [phone] --              Durable Medical Equipment    No services have been selected for the patient.                Dialysis/Infusion    No services have been selected for the patient.                Home Medical Care    No services have been selected for the patient.                Therapy    No services have been selected for the patient.                Community Resources    No services have been selected for the patient.                Community & DME    No services have been selected for the patient.                         Final Discharge Disposition Code: 03 - skilled nursing facility (SNF)